# Patient Record
Sex: MALE | Race: WHITE | NOT HISPANIC OR LATINO | Employment: OTHER | URBAN - METROPOLITAN AREA
[De-identification: names, ages, dates, MRNs, and addresses within clinical notes are randomized per-mention and may not be internally consistent; named-entity substitution may affect disease eponyms.]

---

## 2019-01-10 ENCOUNTER — TRANSCRIBE ORDERS (OUTPATIENT)
Dept: ADMINISTRATIVE | Facility: HOSPITAL | Age: 74
End: 2019-01-10

## 2019-01-10 DIAGNOSIS — I20.0 UNSTABLE ANGINA (HCC): Primary | ICD-10-CM

## 2019-01-16 ENCOUNTER — APPOINTMENT (OUTPATIENT)
Dept: CARDIOLOGY | Facility: HOSPITAL | Age: 74
End: 2019-01-16
Attending: INTERNAL MEDICINE

## 2019-01-16 ENCOUNTER — APPOINTMENT (OUTPATIENT)
Dept: CARDIOLOGY | Facility: HOSPITAL | Age: 74
End: 2019-01-16

## 2019-01-16 ENCOUNTER — HOSPITAL ENCOUNTER (INPATIENT)
Facility: HOSPITAL | Age: 74
LOS: 6 days | Discharge: HOME | End: 2019-01-23
Attending: INTERNAL MEDICINE | Admitting: THORACIC SURGERY (CARDIOTHORACIC VASCULAR SURGERY)

## 2019-01-16 DIAGNOSIS — Z74.09 IMPAIRED FUNCTIONAL MOBILITY, BALANCE, GAIT, AND ENDURANCE: Primary | ICD-10-CM

## 2019-01-16 DIAGNOSIS — I20.0 UNSTABLE ANGINA (HCC): ICD-10-CM

## 2019-01-16 LAB
ANION GAP SERPL CALCULATED.3IONS-SCNC: 5 MMOL/L (ref 3–11)
BUN BLD-MCNC: 16 MG/DL (ref 9–23)
BUN/CREAT SERPL: 17.2 (ref 7–25)
CALCIUM SPEC-SCNC: 9.5 MG/DL (ref 8.7–10.4)
CHLORIDE SERPL-SCNC: 106 MMOL/L (ref 99–109)
CO2 SERPL-SCNC: 28 MMOL/L (ref 20–31)
CREAT BLD-MCNC: 0.93 MG/DL (ref 0.6–1.3)
DEPRECATED RDW RBC AUTO: 40.9 FL (ref 37–54)
ERYTHROCYTE [DISTWIDTH] IN BLOOD BY AUTOMATED COUNT: 12.6 % (ref 11.3–14.5)
GFR SERPL CREATININE-BSD FRML MDRD: 79 ML/MIN/1.73
GLUCOSE BLD-MCNC: 94 MG/DL (ref 70–100)
HCT VFR BLD AUTO: 42.2 % (ref 38.9–50.9)
HGB BLD-MCNC: 14.6 G/DL (ref 13.1–17.5)
MCH RBC QN AUTO: 30.9 PG (ref 27–31)
MCHC RBC AUTO-ENTMCNC: 34.6 G/DL (ref 32–36)
MCV RBC AUTO: 89.2 FL (ref 80–99)
PLATELET # BLD AUTO: 175 10*3/MM3 (ref 150–450)
PMV BLD AUTO: 10.4 FL (ref 6–12)
POTASSIUM BLD-SCNC: 4 MMOL/L (ref 3.5–5.5)
RBC # BLD AUTO: 4.73 10*6/MM3 (ref 4.2–5.76)
SODIUM BLD-SCNC: 139 MMOL/L (ref 132–146)
WBC NRBC COR # BLD: 5.98 10*3/MM3 (ref 3.5–10.8)

## 2019-01-16 PROCEDURE — A9270 NON-COVERED ITEM OR SERVICE: HCPCS | Performed by: INTERNAL MEDICINE

## 2019-01-16 PROCEDURE — C1894 INTRO/SHEATH, NON-LASER: HCPCS | Performed by: INTERNAL MEDICINE

## 2019-01-16 PROCEDURE — 86901 BLOOD TYPING SEROLOGIC RH(D): CPT

## 2019-01-16 PROCEDURE — 25010000002 MIDAZOLAM PER 1 MG: Performed by: INTERNAL MEDICINE

## 2019-01-16 PROCEDURE — B2111ZZ FLUOROSCOPY OF MULTIPLE CORONARY ARTERIES USING LOW OSMOLAR CONTRAST: ICD-10-PCS | Performed by: INTERNAL MEDICINE

## 2019-01-16 PROCEDURE — 25010000002 HEPARIN (PORCINE) PER 1000 UNITS: Performed by: INTERNAL MEDICINE

## 2019-01-16 PROCEDURE — 80048 BASIC METABOLIC PNL TOTAL CA: CPT | Performed by: INTERNAL MEDICINE

## 2019-01-16 PROCEDURE — 93880 EXTRACRANIAL BILAT STUDY: CPT

## 2019-01-16 PROCEDURE — 86900 BLOOD TYPING SEROLOGIC ABO: CPT

## 2019-01-16 PROCEDURE — 63710000001 METOPROLOL TARTRATE 25 MG TABLET: Performed by: INTERNAL MEDICINE

## 2019-01-16 PROCEDURE — 36415 COLL VENOUS BLD VENIPUNCTURE: CPT

## 2019-01-16 PROCEDURE — 4A023N7 MEASUREMENT OF CARDIAC SAMPLING AND PRESSURE, LEFT HEART, PERCUTANEOUS APPROACH: ICD-10-PCS | Performed by: INTERNAL MEDICINE

## 2019-01-16 PROCEDURE — 93458 L HRT ARTERY/VENTRICLE ANGIO: CPT | Performed by: INTERNAL MEDICINE

## 2019-01-16 PROCEDURE — 0 IOPAMIDOL PER 1 ML: Performed by: INTERNAL MEDICINE

## 2019-01-16 PROCEDURE — B2151ZZ FLUOROSCOPY OF LEFT HEART USING LOW OSMOLAR CONTRAST: ICD-10-PCS | Performed by: INTERNAL MEDICINE

## 2019-01-16 PROCEDURE — C1769 GUIDE WIRE: HCPCS | Performed by: INTERNAL MEDICINE

## 2019-01-16 PROCEDURE — 93306 TTE W/DOPPLER COMPLETE: CPT

## 2019-01-16 PROCEDURE — 85027 COMPLETE CBC AUTOMATED: CPT | Performed by: INTERNAL MEDICINE

## 2019-01-16 PROCEDURE — 25010000002 FENTANYL CITRATE (PF) 100 MCG/2ML SOLUTION: Performed by: INTERNAL MEDICINE

## 2019-01-16 RX ORDER — ASPIRIN 81 MG/1
81 TABLET ORAL DAILY
Status: DISCONTINUED | OUTPATIENT
Start: 2019-01-17 | End: 2019-01-17 | Stop reason: SDUPTHER

## 2019-01-16 RX ORDER — MAGNESIUM GLYCINATE 100 MG
1 TABLET ORAL 2 TIMES DAILY
COMMUNITY

## 2019-01-16 RX ORDER — MULTIVIT WITH MINERALS/LUTEIN
1000 TABLET ORAL 2 TIMES DAILY
COMMUNITY

## 2019-01-16 RX ORDER — MIDAZOLAM HYDROCHLORIDE 1 MG/ML
INJECTION INTRAMUSCULAR; INTRAVENOUS AS NEEDED
Status: DISCONTINUED | OUTPATIENT
Start: 2019-01-16 | End: 2019-01-16 | Stop reason: HOSPADM

## 2019-01-16 RX ORDER — ASPIRIN 81 MG/1
81 TABLET ORAL DAILY
COMMUNITY
End: 2019-01-23 | Stop reason: HOSPADM

## 2019-01-16 RX ORDER — LYSINE HCL 500 MG
1000 TABLET ORAL DAILY
COMMUNITY

## 2019-01-16 RX ORDER — ASPIRIN 325 MG
325 TABLET, DELAYED RELEASE (ENTERIC COATED) ORAL DAILY
Status: DISCONTINUED | OUTPATIENT
Start: 2019-01-16 | End: 2019-01-16

## 2019-01-16 RX ORDER — NITROGLYCERIN 0.4 MG/1
0.4 TABLET SUBLINGUAL
COMMUNITY

## 2019-01-16 RX ORDER — FENTANYL CITRATE 50 UG/ML
INJECTION, SOLUTION INTRAMUSCULAR; INTRAVENOUS AS NEEDED
Status: DISCONTINUED | OUTPATIENT
Start: 2019-01-16 | End: 2019-01-16 | Stop reason: HOSPADM

## 2019-01-16 RX ORDER — NIACIN 500 MG
500 TABLET ORAL NIGHTLY
COMMUNITY

## 2019-01-16 RX ORDER — LIDOCAINE HYDROCHLORIDE 10 MG/ML
INJECTION, SOLUTION EPIDURAL; INFILTRATION; INTRACAUDAL; PERINEURAL AS NEEDED
Status: DISCONTINUED | OUTPATIENT
Start: 2019-01-16 | End: 2019-01-16 | Stop reason: HOSPADM

## 2019-01-16 RX ORDER — ATORVASTATIN CALCIUM 40 MG/1
40 TABLET, FILM COATED ORAL NIGHTLY
Status: DISCONTINUED | OUTPATIENT
Start: 2019-01-16 | End: 2019-01-23 | Stop reason: HOSPADM

## 2019-01-16 RX ADMIN — METOPROLOL TARTRATE 25 MG: 25 TABLET ORAL at 22:01

## 2019-01-16 RX ADMIN — ASPIRIN 325 MG: 325 TABLET, DELAYED RELEASE ORAL at 12:19

## 2019-01-16 NOTE — H&P
Pre-Cardiac Catheterization Report  Cardiovascular Laboratory  [facility]      Patient:  Yosef Hardy  :  1945  PCP:  Mario Bashir MD  PHONE:  [phone]    DATE: 2019    BRIEF HPI:  Yosef Hardy is a 73 y.o. male with hypertension and a family history of premature coronary artery disease.  He is complaining of chest discomfort which he describes as a tightness that radiates to his neck and jaw.  He states it has been increasing over the last several months.  Associated symptoms include shortness of breath.  He states that his symptoms are exertion related and are relieved with rest.  He says his symptoms also increase after eating a large meal.  He recently underwent a stress echo and pliable that was abnormal.  He now presents for left heart catheterization with possible intervention.    Cardiac Risk Factors:  advanced age (older than 55 for men, 65 for women), family history of premature cardiovascular disease, hypertension, male gender    Anginal class in last 2 weeks:  CCS class III    CHF Class in last 2 weeks:  NYHA Class II    Cardiogenic shock:  no    Cardiac arrest <24 hours:  no    Stress test within last 6 months:   yes   Details:    Previous cardiac catheterization:  no  Details:     Previous CABG:  no  Details:      Allergies:     IV contrast allergy:  no  Allergies not on file    MEDICATIONS:  Prior to Admission medications    Not on File       Past medical & surgical history, social and family history reviewed in the electronic medical record.    ROS:  Cardiovascular ROS: positive for - chest pain and dyspnea on exertion    Physical Exam:    Vitals: There were no vitals filed for this visit. There were no vitals filed for this visit.    General Appearance:    Alert, cooperative, in no acute distress   Head:    Normocephalic, without obvious abnormality, atraumatic   Eyes:            Lids and lashes normal, conjunctivae and sclerae normal, no   icterus, no pallor,  corneas clear, PERRLA   Ears:    Ears appear intact with no abnormalities noted   Throat:      Neck:   No adenopathy, supple, trachea midline, no thyromegaly, no JVD   Back:     No kyphosis present, no scoliosis present, range of motion normal   Lungs:     Clear to auscultation,respirations regular, even and                  unlabored    Heart:    Regular rhythm and normal rate, normal S1 and S2, no            murmur, no gallop, no rub, no click   Chest Wall:    No abnormalities observed   Abdomen:     Normal bowel sounds, no masses, no organomegaly, soft        non-tender, non-distended, no guarding, no rebound                tenderness   Rectal:     Deferred   Extremities:   Moves all extremities well, no edema, no cyanosis, no             redness   Pulses:   Pulses palpable and equal bilaterally   Skin:   No bleeding, bruising or rash   Lymph nodes:      Neurologic:   Cranial nerves 2 - 12 grossly intact, sensation intact     Barbaeu Test:  Left: Normal  (oxymetric Allens) Right: Not Assessed             No results found for: CHLPL, TRIG, HDL, LDLDIRECT, AST, ALT        Impression      · Unstable angina/abnormal stress echo    Plan     · Procedure to perform: [facility]  · Planned access:  Left radial artery      OSCAR Amador  01/16/19  11:38 AM

## 2019-01-17 ENCOUNTER — APPOINTMENT (OUTPATIENT)
Dept: GENERAL RADIOLOGY | Facility: HOSPITAL | Age: 74
End: 2019-01-17

## 2019-01-17 ENCOUNTER — ANESTHESIA (OUTPATIENT)
Dept: PERIOP | Facility: HOSPITAL | Age: 74
End: 2019-01-17

## 2019-01-17 ENCOUNTER — ANESTHESIA EVENT (OUTPATIENT)
Dept: PERIOP | Facility: HOSPITAL | Age: 74
End: 2019-01-17

## 2019-01-17 PROBLEM — E78.5 HYPERLIPIDEMIA: Status: ACTIVE | Noted: 2019-01-17

## 2019-01-17 PROBLEM — Z95.1 S/P CABG X 4: Status: ACTIVE | Noted: 2019-01-17

## 2019-01-17 PROBLEM — I10 HYPERTENSION: Status: ACTIVE | Noted: 2019-01-17

## 2019-01-17 PROBLEM — E07.9 DISEASE OF THYROID GLAND: Status: ACTIVE | Noted: 2019-01-17

## 2019-01-17 PROBLEM — I25.10 CORONARY ARTERY DISEASE: Status: ACTIVE | Noted: 2019-01-17

## 2019-01-17 LAB
ABO GROUP BLD: NORMAL
ABO GROUP BLD: NORMAL
ACT BLD: 114 SECONDS (ref 82–152)
ACT BLD: 114 SECONDS (ref 82–152)
ACT BLD: 125 SECONDS (ref 82–152)
ACT BLD: 125 SECONDS (ref 82–152)
ACT BLD: 455 SECONDS (ref 82–152)
ACT BLD: 488 SECONDS (ref 82–152)
ACT BLD: 516 SECONDS (ref 82–152)
ACT BLD: 527 SECONDS (ref 82–152)
ACT BLD: 549 SECONDS (ref 82–152)
ALBUMIN SERPL-MCNC: 3.54 G/DL (ref 3.2–4.8)
ALBUMIN SERPL-MCNC: 3.62 G/DL (ref 3.2–4.8)
ANION GAP SERPL CALCULATED.3IONS-SCNC: 6 MMOL/L (ref 3–11)
ANION GAP SERPL CALCULATED.3IONS-SCNC: 8 MMOL/L (ref 3–11)
ANION GAP SERPL CALCULATED.3IONS-SCNC: 9 MMOL/L (ref 3–11)
APTT PPP: 36.3 SECONDS (ref 24–37)
ARTERIAL PATENCY WRIST A: ABNORMAL
ARTERIAL PATENCY WRIST A: ABNORMAL
ARTICHOKE IGE QN: 207 MG/DL (ref 0–130)
ATMOSPHERIC PRESS: ABNORMAL MMHG
ATMOSPHERIC PRESS: ABNORMAL MMHG
BASE EXCESS BLDA CALC-SCNC: -2 MMOL/L (ref -5–5)
BASE EXCESS BLDA CALC-SCNC: 0.1 MMOL/L (ref 0–2)
BASE EXCESS BLDA CALC-SCNC: 0.5 MMOL/L (ref 0–2)
BASE EXCESS BLDA CALC-SCNC: 1 MMOL/L (ref -5–5)
BASE EXCESS BLDA CALC-SCNC: 3 MMOL/L (ref -5–5)
BASE EXCESS BLDA CALC-SCNC: 4 MMOL/L (ref -5–5)
BASE EXCESS BLDA CALC-SCNC: 6 MMOL/L (ref -5–5)
BASE EXCESS BLDA CALC-SCNC: 6 MMOL/L (ref -5–5)
BASE EXCESS BLDA CALC-SCNC: 7 MMOL/L (ref -5–5)
BDY SITE: ABNORMAL
BDY SITE: ABNORMAL
BH CV ECHO MEAS - AO MAX PG (FULL): 5.4 MMHG
BH CV ECHO MEAS - AO MAX PG: 7.7 MMHG
BH CV ECHO MEAS - AO MEAN PG (FULL): 3 MMHG
BH CV ECHO MEAS - AO MEAN PG: 4.3 MMHG
BH CV ECHO MEAS - AO ROOT AREA: 8.3 CM^2
BH CV ECHO MEAS - AO ROOT DIAM: 3.3 CM
BH CV ECHO MEAS - AO V2 MAX: 139 CM/SEC
BH CV ECHO MEAS - AO V2 MEAN: 95.6 CM/SEC
BH CV ECHO MEAS - AO V2 VTI: 33.6 CM
BH CV ECHO MEAS - ASC AORTA: 3.2 CM
BH CV ECHO MEAS - AVA(I,A): 2.9 CM^2
BH CV ECHO MEAS - AVA(I,D): 2.9 CM^2
BH CV ECHO MEAS - AVA(V,A): 2.3 CM^2
BH CV ECHO MEAS - AVA(V,D): 2.3 CM^2
BH CV ECHO MEAS - EDV(CUBED): 109.3 ML
BH CV ECHO MEAS - EDV(TEICH): 106.6 ML
BH CV ECHO MEAS - EF(CUBED): 83 %
BH CV ECHO MEAS - EF(TEICH): 75.9 %
BH CV ECHO MEAS - ESV(CUBED): 18.5 ML
BH CV ECHO MEAS - ESV(TEICH): 25.7 ML
BH CV ECHO MEAS - FS: 44.7 %
BH CV ECHO MEAS - IVS/LVPW: 0.93
BH CV ECHO MEAS - IVSD: 1 CM
BH CV ECHO MEAS - LA DIMENSION: 3.6 CM
BH CV ECHO MEAS - LA/AO: 1.1
BH CV ECHO MEAS - LAT PEAK E' VEL: 7.9 CM/SEC
BH CV ECHO MEAS - LATERAL E/E' RATIO: 9
BH CV ECHO MEAS - LV MASS(C)D: 183 GRAMS
BH CV ECHO MEAS - LV MAX PG: 2.4 MMHG
BH CV ECHO MEAS - LV MEAN PG: 1.3 MMHG
BH CV ECHO MEAS - LV V1 MAX: 77 CM/SEC
BH CV ECHO MEAS - LV V1 MEAN: 51.5 CM/SEC
BH CV ECHO MEAS - LV V1 VTI: 23.7 CM
BH CV ECHO MEAS - LVIDD: 4.8 CM
BH CV ECHO MEAS - LVIDS: 2.6 CM
BH CV ECHO MEAS - LVOT AREA (M): 4.2 CM^2
BH CV ECHO MEAS - LVOT AREA: 4.1 CM^2
BH CV ECHO MEAS - LVOT DIAM: 2.3 CM
BH CV ECHO MEAS - LVPWD: 1.1 CM
BH CV ECHO MEAS - MED PEAK E' VEL: 6.4 CM/SEC
BH CV ECHO MEAS - MEDIAL E/E' RATIO: 11.1
BH CV ECHO MEAS - MV A MAX VEL: 92.3 CM/SEC
BH CV ECHO MEAS - MV DEC TIME: 0.26 SEC
BH CV ECHO MEAS - MV E MAX VEL: 72.6 CM/SEC
BH CV ECHO MEAS - MV E/A: 0.79
BH CV ECHO MEAS - PA ACC SLOPE: 456.9 CM/SEC^2
BH CV ECHO MEAS - PA ACC TIME: 0.17 SEC
BH CV ECHO MEAS - PA MAX PG: 4.7 MMHG
BH CV ECHO MEAS - PA PR(ACCEL): 0.57 MMHG
BH CV ECHO MEAS - PA V2 MAX: 108 CM/SEC
BH CV ECHO MEAS - RAP SYSTOLE: 8 MMHG
BH CV ECHO MEAS - RVDD: 1.8 CM
BH CV ECHO MEAS - RVSP: 30 MMHG
BH CV ECHO MEAS - SV(AO): 280.5 ML
BH CV ECHO MEAS - SV(CUBED): 90.8 ML
BH CV ECHO MEAS - SV(LVOT): 97.8 ML
BH CV ECHO MEAS - SV(TEICH): 80.9 ML
BH CV ECHO MEAS - TAPSE (>1.6): 2.5 CM2
BH CV ECHO MEAS - TR MAX PG: 22 MMHG
BH CV ECHO MEAS - TR MAX VEL: 230.2 CM/SEC
BH CV ECHO MEASUREMENTS AVERAGE E/E' RATIO: 10.15
BH CV XLRA MEAS LEFT CCA RATIO VEL: 88.7 CM/SEC
BH CV XLRA MEAS LEFT DIST CCA EDV: 18 CM/SEC
BH CV XLRA MEAS LEFT DIST CCA PSV: 89.8 CM/SEC
BH CV XLRA MEAS LEFT DIST ICA EDV: 31.4 CM/SEC
BH CV XLRA MEAS LEFT DIST ICA PSV: 121.2 CM/SEC
BH CV XLRA MEAS LEFT ICA RATIO VEL: 106 CM/SEC
BH CV XLRA MEAS LEFT ICA/CCA RATIO: 1.2
BH CV XLRA MEAS LEFT MID CCA EDV: 24.7 CM/SEC
BH CV XLRA MEAS LEFT MID CCA PSV: 128 CM/SEC
BH CV XLRA MEAS LEFT MID ICA EDV: 31.4 CM/SEC
BH CV XLRA MEAS LEFT MID ICA PSV: 106.6 CM/SEC
BH CV XLRA MEAS LEFT PROX CCA EDV: 19.1 CM/SEC
BH CV XLRA MEAS LEFT PROX CCA PSV: 121.2 CM/SEC
BH CV XLRA MEAS LEFT PROX ECA EDV: 9 CM/SEC
BH CV XLRA MEAS LEFT PROX ECA PSV: 117 CM/SEC
BH CV XLRA MEAS LEFT PROX ICA EDV: 24.7 CM/SEC
BH CV XLRA MEAS LEFT PROX ICA PSV: 94.3 CM/SEC
BH CV XLRA MEAS LEFT PROX SCLA PSV: 168.4 CM/SEC
BH CV XLRA MEAS LEFT VERTEBRAL A EDV: 13 CM/SEC
BH CV XLRA MEAS LEFT VERTEBRAL A PSV: 50 CM/SEC
BH CV XLRA MEAS RIGHT BULB EDV: 29.5 CM/SEC
BH CV XLRA MEAS RIGHT BULB PSV: 178.2 CM/SEC
BH CV XLRA MEAS RIGHT CCA RATIO VEL: 83.1 CM/SEC
BH CV XLRA MEAS RIGHT DIST CCA EDV: 20.2 CM/SEC
BH CV XLRA MEAS RIGHT DIST CCA PSV: 84.2 CM/SEC
BH CV XLRA MEAS RIGHT DIST ICA EDV: 33.7 CM/SEC
BH CV XLRA MEAS RIGHT DIST ICA PSV: 119.3 CM/SEC
BH CV XLRA MEAS RIGHT ICA RATIO VEL: 222 CM/SEC
BH CV XLRA MEAS RIGHT ICA/CCA RATIO: 2.7
BH CV XLRA MEAS RIGHT MID CCA EDV: 24.7 CM/SEC
BH CV XLRA MEAS RIGHT MID CCA PSV: 117.9 CM/SEC
BH CV XLRA MEAS RIGHT MID ICA EDV: 30.9 CM/SEC
BH CV XLRA MEAS RIGHT MID ICA PSV: 223.1 CM/SEC
BH CV XLRA MEAS RIGHT PROX CCA EDV: 16.8 CM/SEC
BH CV XLRA MEAS RIGHT PROX CCA PSV: 112.2 CM/SEC
BH CV XLRA MEAS RIGHT PROX ECA EDV: 11 CM/SEC
BH CV XLRA MEAS RIGHT PROX ECA PSV: 169 CM/SEC
BH CV XLRA MEAS RIGHT PROX ICA EDV: 32.3 CM/SEC
BH CV XLRA MEAS RIGHT PROX ICA PSV: 167 CM/SEC
BH CV XLRA MEAS RIGHT PROX SCLA PSV: 180.7 CM/SEC
BH CV XLRA MEAS RIGHT VERTEBRAL A EDV: 16.6 CM/SEC
BH CV XLRA MEAS RIGHT VERTEBRAL A PSV: 63.7 CM/SEC
BLD GP AB SCN SERPL QL: NEGATIVE
BODY TEMPERATURE: 37 C
BODY TEMPERATURE: 37 C
BUN BLD-MCNC: 11 MG/DL (ref 9–23)
BUN BLD-MCNC: 15 MG/DL (ref 9–23)
BUN BLD-MCNC: 19 MG/DL (ref 9–23)
BUN/CREAT SERPL: 10.4 (ref 7–25)
BUN/CREAT SERPL: 11.2 (ref 7–25)
BUN/CREAT SERPL: 13.2 (ref 7–25)
CA-I BLDA-SCNC: 0.9 MMOL/L (ref 1.2–1.32)
CA-I BLDA-SCNC: 1 MMOL/L (ref 1.2–1.32)
CA-I BLDA-SCNC: 1.23 MMOL/L (ref 1.2–1.32)
CA-I BLDA-SCNC: 1.3 MMOL/L (ref 1.2–1.32)
CA-I BLDA-SCNC: 1.31 MMOL/L (ref 1.2–1.32)
CA-I BLDA-SCNC: 1.38 MMOL/L (ref 1.2–1.32)
CA-I BLDA-SCNC: 1.39 MMOL/L (ref 1.2–1.32)
CA-I SERPL ISE-MCNC: 1.37 MMOL/L (ref 1.12–1.32)
CALCIUM SPEC-SCNC: 7.9 MG/DL (ref 8.7–10.4)
CALCIUM SPEC-SCNC: 8.8 MG/DL (ref 8.7–10.4)
CALCIUM SPEC-SCNC: 9.6 MG/DL (ref 8.7–10.4)
CHLORIDE SERPL-SCNC: 107 MMOL/L (ref 99–109)
CHLORIDE SERPL-SCNC: 108 MMOL/L (ref 99–109)
CHLORIDE SERPL-SCNC: 112 MMOL/L (ref 99–109)
CHOLEST SERPL-MCNC: 243 MG/DL (ref 0–200)
CO2 BLDA-SCNC: 24 MMOL/L (ref 24–29)
CO2 BLDA-SCNC: 26.4 MMOL/L (ref 23–27)
CO2 BLDA-SCNC: 27 MMOL/L (ref 24–29)
CO2 BLDA-SCNC: 27.9 MMOL/L (ref 23–27)
CO2 BLDA-SCNC: 29 MMOL/L (ref 24–29)
CO2 BLDA-SCNC: 29 MMOL/L (ref 24–29)
CO2 BLDA-SCNC: 30 MMOL/L (ref 24–29)
CO2 BLDA-SCNC: 32 MMOL/L (ref 24–29)
CO2 BLDA-SCNC: 32 MMOL/L (ref 24–29)
CO2 SERPL-SCNC: 23 MMOL/L (ref 20–31)
CO2 SERPL-SCNC: 24 MMOL/L (ref 20–31)
CO2 SERPL-SCNC: 25 MMOL/L (ref 20–31)
COHGB MFR BLD: 0.9 % (ref 0–2)
COHGB MFR BLD: 1.4 % (ref 0–2)
CREAT BLD-MCNC: 1.06 MG/DL (ref 0.6–1.3)
CREAT BLD-MCNC: 1.14 MG/DL (ref 0.6–1.3)
CREAT BLD-MCNC: 1.69 MG/DL (ref 0.6–1.3)
DEPRECATED RDW RBC AUTO: 41.5 FL (ref 37–54)
DEPRECATED RDW RBC AUTO: 41.8 FL (ref 37–54)
DEPRECATED RDW RBC AUTO: 42.9 FL (ref 37–54)
ERYTHROCYTE [DISTWIDTH] IN BLOOD BY AUTOMATED COUNT: 12.8 % (ref 11.3–14.5)
ERYTHROCYTE [DISTWIDTH] IN BLOOD BY AUTOMATED COUNT: 12.9 % (ref 11.3–14.5)
ERYTHROCYTE [DISTWIDTH] IN BLOOD BY AUTOMATED COUNT: 13.2 % (ref 11.3–14.5)
GFR SERPL CREATININE-BSD FRML MDRD: 40 ML/MIN/1.73
GFR SERPL CREATININE-BSD FRML MDRD: 63 ML/MIN/1.73
GFR SERPL CREATININE-BSD FRML MDRD: 68 ML/MIN/1.73
GLUCOSE BLD-MCNC: 100 MG/DL (ref 70–100)
GLUCOSE BLD-MCNC: 123 MG/DL (ref 70–100)
GLUCOSE BLD-MCNC: 193 MG/DL (ref 70–100)
GLUCOSE BLDC GLUCOMTR-MCNC: 106 MG/DL (ref 70–130)
GLUCOSE BLDC GLUCOMTR-MCNC: 115 MG/DL (ref 70–130)
GLUCOSE BLDC GLUCOMTR-MCNC: 118 MG/DL (ref 70–130)
GLUCOSE BLDC GLUCOMTR-MCNC: 118 MG/DL (ref 70–130)
GLUCOSE BLDC GLUCOMTR-MCNC: 120 MG/DL (ref 70–130)
GLUCOSE BLDC GLUCOMTR-MCNC: 122 MG/DL (ref 70–130)
GLUCOSE BLDC GLUCOMTR-MCNC: 125 MG/DL (ref 70–130)
GLUCOSE BLDC GLUCOMTR-MCNC: 126 MG/DL (ref 70–130)
GLUCOSE BLDC GLUCOMTR-MCNC: 132 MG/DL (ref 70–130)
GLUCOSE BLDC GLUCOMTR-MCNC: 133 MG/DL (ref 70–130)
GLUCOSE BLDC GLUCOMTR-MCNC: 154 MG/DL (ref 70–130)
GLUCOSE BLDC GLUCOMTR-MCNC: 171 MG/DL (ref 70–130)
GLUCOSE BLDC GLUCOMTR-MCNC: 173 MG/DL (ref 70–130)
GLUCOSE BLDC GLUCOMTR-MCNC: 178 MG/DL (ref 70–130)
GLUCOSE BLDC GLUCOMTR-MCNC: 92 MG/DL (ref 70–130)
HCO3 BLDA-SCNC: 22.7 MMOL/L (ref 22–26)
HCO3 BLDA-SCNC: 25.2 MMOL/L (ref 20–26)
HCO3 BLDA-SCNC: 26.1 MMOL/L (ref 22–26)
HCO3 BLDA-SCNC: 26.4 MMOL/L (ref 20–26)
HCO3 BLDA-SCNC: 27.5 MMOL/L (ref 22–26)
HCO3 BLDA-SCNC: 27.7 MMOL/L (ref 22–26)
HCO3 BLDA-SCNC: 29.2 MMOL/L (ref 22–26)
HCO3 BLDA-SCNC: 30.7 MMOL/L (ref 22–26)
HCO3 BLDA-SCNC: 30.9 MMOL/L (ref 22–26)
HCT VFR BLD AUTO: 23.4 % (ref 38.9–50.9)
HCT VFR BLD AUTO: 27.2 % (ref 38.9–50.9)
HCT VFR BLD AUTO: 29.4 % (ref 38.9–50.9)
HCT VFR BLD CALC: 28.9 %
HCT VFR BLD CALC: 29.6 %
HCT VFR BLDA CALC: 26 % (ref 38–51)
HCT VFR BLDA CALC: 27 % (ref 38–51)
HCT VFR BLDA CALC: 38 % (ref 38–51)
HCT VFR BLDA CALC: 43 % (ref 38–51)
HDLC SERPL-MCNC: 36 MG/DL (ref 40–60)
HGB BLD-MCNC: 10.1 G/DL (ref 13.1–17.5)
HGB BLD-MCNC: 8 G/DL (ref 13.1–17.5)
HGB BLD-MCNC: 9.4 G/DL (ref 13.1–17.5)
HGB BLDA-MCNC: 12.9 G/DL (ref 12–17)
HGB BLDA-MCNC: 14.6 G/DL (ref 12–17)
HGB BLDA-MCNC: 8.8 G/DL (ref 12–17)
HGB BLDA-MCNC: 9.2 G/DL (ref 12–17)
HGB BLDA-MCNC: 9.4 G/DL (ref 13.5–17.5)
HGB BLDA-MCNC: 9.7 G/DL (ref 13.5–17.5)
HOROWITZ INDEX BLD+IHG-RTO: 100 %
HOROWITZ INDEX BLD+IHG-RTO: 40 %
INR PPP: 1.05 (ref 0.85–1.16)
INR PPP: 1.27 (ref 0.85–1.16)
MAGNESIUM SERPL-MCNC: 2.8 MG/DL (ref 1.3–2.7)
MAGNESIUM SERPL-MCNC: 3.7 MG/DL (ref 1.3–2.7)
MAXIMAL PREDICTED HEART RATE: 146 BPM
MCH RBC QN AUTO: 30.7 PG (ref 27–31)
MCH RBC QN AUTO: 30.8 PG (ref 27–31)
MCH RBC QN AUTO: 31 PG (ref 27–31)
MCHC RBC AUTO-ENTMCNC: 34.2 G/DL (ref 32–36)
MCHC RBC AUTO-ENTMCNC: 34.4 G/DL (ref 32–36)
MCHC RBC AUTO-ENTMCNC: 34.6 G/DL (ref 32–36)
MCV RBC AUTO: 89.2 FL (ref 80–99)
MCV RBC AUTO: 89.7 FL (ref 80–99)
MCV RBC AUTO: 90.2 FL (ref 80–99)
METHGB BLD QL: 1.4 % (ref 0–1.5)
METHGB BLD QL: 1.5 % (ref 0–1.5)
MODALITY: ABNORMAL
MODALITY: ABNORMAL
NOTE: ABNORMAL
NOTE: ABNORMAL
OXYHGB MFR BLDV: 95.1 % (ref 94–99)
OXYHGB MFR BLDV: 97.6 % (ref 94–99)
PCO2 BLDA: 31.8 MM HG (ref 35–45)
PCO2 BLDA: 35 MM HG (ref 35–45)
PCO2 BLDA: 37.6 MM HG (ref 35–45)
PCO2 BLDA: 39.7 MM HG
PCO2 BLDA: 40.2 MM HG (ref 35–45)
PCO2 BLDA: 42.7 MM HG (ref 35–45)
PCO2 BLDA: 49.7 MM HG
PCO2 BLDA: 51 MM HG (ref 35–45)
PCO2 BLDA: 53.1 MM HG (ref 35–45)
PCO2 TEMP ADJ BLD: 39.7 MM HG (ref 35–48)
PCO2 TEMP ADJ BLD: 49.7 MM HG (ref 35–48)
PH BLDA: 7.32 PH UNITS (ref 7.35–7.6)
PH BLDA: 7.33 PH UNITS (ref 7.35–7.45)
PH BLDA: 7.39 PH UNITS (ref 7.35–7.6)
PH BLDA: 7.41 PH UNITS (ref 7.35–7.45)
PH BLDA: 7.42 PH UNITS (ref 7.35–7.6)
PH BLDA: 7.47 PH UNITS (ref 7.35–7.6)
PH BLDA: 7.47 PH UNITS (ref 7.35–7.6)
PH BLDA: 7.48 PH UNITS (ref 7.35–7.6)
PH BLDA: 7.52 PH UNITS (ref 7.35–7.6)
PH, TEMP CORRECTED: 7.33 PH UNITS
PH, TEMP CORRECTED: 7.41 PH UNITS
PHOSPHATE SERPL-MCNC: 2.7 MG/DL (ref 2.4–5.1)
PHOSPHATE SERPL-MCNC: 3.1 MG/DL (ref 2.4–5.1)
PLATELET # BLD AUTO: 123 10*3/MM3 (ref 150–450)
PLATELET # BLD AUTO: 151 10*3/MM3 (ref 150–450)
PLATELET # BLD AUTO: 228 10*3/MM3 (ref 150–450)
PMV BLD AUTO: 10.1 FL (ref 6–12)
PMV BLD AUTO: 10.2 FL (ref 6–12)
PMV BLD AUTO: 10.3 FL (ref 6–12)
PO2 BLDA: 291 MMHG (ref 80–105)
PO2 BLDA: 311 MM HG (ref 83–108)
PO2 BLDA: 340 MMHG (ref 80–105)
PO2 BLDA: 374 MMHG (ref 80–105)
PO2 BLDA: 434 MMHG (ref 80–105)
PO2 BLDA: 506 MMHG (ref 80–105)
PO2 BLDA: 521 MMHG (ref 80–105)
PO2 BLDA: 86 MMHG (ref 80–105)
PO2 BLDA: 99.4 MM HG (ref 83–108)
PO2 TEMP ADJ BLD: 311 MM HG (ref 83–108)
PO2 TEMP ADJ BLD: 99.4 MM HG (ref 83–108)
POTASSIUM BLD-SCNC: 3.6 MMOL/L (ref 3.5–5.5)
POTASSIUM BLD-SCNC: 3.9 MMOL/L (ref 3.5–5.5)
POTASSIUM BLD-SCNC: 4.2 MMOL/L (ref 3.5–5.5)
POTASSIUM BLDA-SCNC: 4 MMOL/L (ref 3.5–4.9)
POTASSIUM BLDA-SCNC: 4.4 MMOL/L (ref 3.5–4.9)
POTASSIUM BLDA-SCNC: 4.7 MMOL/L (ref 3.5–4.9)
POTASSIUM BLDA-SCNC: 5 MMOL/L (ref 3.5–4.9)
POTASSIUM BLDA-SCNC: 5.2 MMOL/L (ref 3.5–4.9)
POTASSIUM BLDA-SCNC: 5.2 MMOL/L (ref 3.5–4.9)
POTASSIUM BLDA-SCNC: 5.5 MMOL/L (ref 3.5–4.9)
PROTHROMBIN TIME: 13.2 SECONDS (ref 11.2–14.3)
PROTHROMBIN TIME: 15.3 SECONDS (ref 11.2–14.3)
RBC # BLD AUTO: 2.61 10*6/MM3 (ref 4.2–5.76)
RBC # BLD AUTO: 3.05 10*6/MM3 (ref 4.2–5.76)
RBC # BLD AUTO: 3.26 10*6/MM3 (ref 4.2–5.76)
RH BLD: NEGATIVE
RH BLD: NEGATIVE
SAO2 % BLDA: 100 % (ref 95–98)
SAO2 % BLDA: 97 % (ref 95–98)
SODIUM BLD-SCNC: 140 MMOL/L (ref 132–146)
SODIUM BLD-SCNC: 140 MMOL/L (ref 132–146)
SODIUM BLD-SCNC: 142 MMOL/L (ref 132–146)
SODIUM BLDA-SCNC: 134 MMOL/L (ref 138–146)
SODIUM BLDA-SCNC: 137 MMOL/L (ref 138–146)
SODIUM BLDA-SCNC: 138 MMOL/L (ref 138–146)
SODIUM BLDA-SCNC: 138 MMOL/L (ref 138–146)
SODIUM BLDA-SCNC: 139 MMOL/L (ref 138–146)
SODIUM BLDA-SCNC: 140 MMOL/L (ref 138–146)
SODIUM BLDA-SCNC: 143 MMOL/L (ref 138–146)
STRESS TARGET HR: 124 BPM
T&S EXPIRATION DATE: NORMAL
TRIGL SERPL-MCNC: 110 MG/DL (ref 0–150)
VENTILATOR MODE: ABNORMAL
VENTILATOR MODE: ABNORMAL
WBC NRBC COR # BLD: 16.66 10*3/MM3 (ref 3.5–10.8)
WBC NRBC COR # BLD: 8.38 10*3/MM3 (ref 3.5–10.8)
WBC NRBC COR # BLD: 9.37 10*3/MM3 (ref 3.5–10.8)

## 2019-01-17 PROCEDURE — 99223 1ST HOSP IP/OBS HIGH 75: CPT | Performed by: THORACIC SURGERY (CARDIOTHORACIC VASCULAR SURGERY)

## 2019-01-17 PROCEDURE — 71045 X-RAY EXAM CHEST 1 VIEW: CPT

## 2019-01-17 PROCEDURE — C1894 INTRO/SHEATH, NON-LASER: HCPCS | Performed by: THORACIC SURGERY (CARDIOTHORACIC VASCULAR SURGERY)

## 2019-01-17 PROCEDURE — C1751 CATH, INF, PER/CENT/MIDLINE: HCPCS | Performed by: ANESTHESIOLOGY

## 2019-01-17 PROCEDURE — 94002 VENT MGMT INPAT INIT DAY: CPT

## 2019-01-17 PROCEDURE — 25010000002 ALBUMIN HUMAN 5% PER 50 ML: Performed by: ANESTHESIOLOGY

## 2019-01-17 PROCEDURE — 25010000002 FENTANYL CITRATE (PF) 100 MCG/2ML SOLUTION: Performed by: THORACIC SURGERY (CARDIOTHORACIC VASCULAR SURGERY)

## 2019-01-17 PROCEDURE — 36430 TRANSFUSION BLD/BLD COMPNT: CPT

## 2019-01-17 PROCEDURE — 5A1221Z PERFORMANCE OF CARDIAC OUTPUT, CONTINUOUS: ICD-10-PCS | Performed by: THORACIC SURGERY (CARDIOTHORACIC VASCULAR SURGERY)

## 2019-01-17 PROCEDURE — 80061 LIPID PANEL: CPT | Performed by: INTERNAL MEDICINE

## 2019-01-17 PROCEDURE — 33533 CABG ARTERIAL SINGLE: CPT | Performed by: THORACIC SURGERY (CARDIOTHORACIC VASCULAR SURGERY)

## 2019-01-17 PROCEDURE — 25010000002 ALBUMIN HUMAN 5% PER 50 ML: Performed by: THORACIC SURGERY (CARDIOTHORACIC VASCULAR SURGERY)

## 2019-01-17 PROCEDURE — 80048 BASIC METABOLIC PNL TOTAL CA: CPT | Performed by: THORACIC SURGERY (CARDIOTHORACIC VASCULAR SURGERY)

## 2019-01-17 PROCEDURE — 86900 BLOOD TYPING SEROLOGIC ABO: CPT | Performed by: THORACIC SURGERY (CARDIOTHORACIC VASCULAR SURGERY)

## 2019-01-17 PROCEDURE — 25010000002 AMIODARONE IN DEXTROSE 5% 360-4.14 MG/200ML-% SOLUTION

## 2019-01-17 PROCEDURE — 25810000003 DEXTROSE 5 % WITH KCL 20 MEQ 20-5 MEQ/L-% SOLUTION: Performed by: PHYSICIAN ASSISTANT

## 2019-01-17 PROCEDURE — 93005 ELECTROCARDIOGRAM TRACING: CPT | Performed by: PHYSICIAN ASSISTANT

## 2019-01-17 PROCEDURE — 25010000002 CEFUROXIME: Performed by: PHYSICIAN ASSISTANT

## 2019-01-17 PROCEDURE — 85610 PROTHROMBIN TIME: CPT | Performed by: PHYSICIAN ASSISTANT

## 2019-01-17 PROCEDURE — 63710000001 FAMOTIDINE 20 MG TABLET: Performed by: ANESTHESIOLOGY

## 2019-01-17 PROCEDURE — 25010000002 CEFUROXIME: Performed by: THORACIC SURGERY (CARDIOTHORACIC VASCULAR SURGERY)

## 2019-01-17 PROCEDURE — 25010000002 ALBUMIN HUMAN 5% PER 50 ML: Performed by: PHYSICIAN ASSISTANT

## 2019-01-17 PROCEDURE — 86900 BLOOD TYPING SEROLOGIC ABO: CPT

## 2019-01-17 PROCEDURE — 85610 PROTHROMBIN TIME: CPT | Performed by: THORACIC SURGERY (CARDIOTHORACIC VASCULAR SURGERY)

## 2019-01-17 PROCEDURE — 25010000002 PHENYLEPHRINE 10 MG/ML SOLUTION: Performed by: PHYSICIAN ASSISTANT

## 2019-01-17 PROCEDURE — 63710000001 MUPIROCIN 2 % OINTMENT: Performed by: THORACIC SURGERY (CARDIOTHORACIC VASCULAR SURGERY)

## 2019-01-17 PROCEDURE — 06BP4ZZ EXCISION OF RIGHT SAPHENOUS VEIN, PERCUTANEOUS ENDOSCOPIC APPROACH: ICD-10-PCS | Performed by: THORACIC SURGERY (CARDIOTHORACIC VASCULAR SURGERY)

## 2019-01-17 PROCEDURE — 25010000002 FENTANYL CITRATE (PF) 100 MCG/2ML SOLUTION: Performed by: ANESTHESIOLOGY

## 2019-01-17 PROCEDURE — 86923 COMPATIBILITY TEST ELECTRIC: CPT

## 2019-01-17 PROCEDURE — 84295 ASSAY OF SERUM SODIUM: CPT

## 2019-01-17 PROCEDURE — 25010000003 DOPAMINE PER 40 MG: Performed by: ANESTHESIOLOGY

## 2019-01-17 PROCEDURE — 25010000002 HEPARIN (PORCINE) PER 1000 UNITS: Performed by: THORACIC SURGERY (CARDIOTHORACIC VASCULAR SURGERY)

## 2019-01-17 PROCEDURE — A9270 NON-COVERED ITEM OR SERVICE: HCPCS | Performed by: THORACIC SURGERY (CARDIOTHORACIC VASCULAR SURGERY)

## 2019-01-17 PROCEDURE — P9041 ALBUMIN (HUMAN),5%, 50ML: HCPCS | Performed by: THORACIC SURGERY (CARDIOTHORACIC VASCULAR SURGERY)

## 2019-01-17 PROCEDURE — 99233 SBSQ HOSP IP/OBS HIGH 50: CPT | Performed by: INTERNAL MEDICINE

## 2019-01-17 PROCEDURE — 82805 BLOOD GASES W/O2 SATURATION: CPT

## 2019-01-17 PROCEDURE — 86901 BLOOD TYPING SEROLOGIC RH(D): CPT | Performed by: THORACIC SURGERY (CARDIOTHORACIC VASCULAR SURGERY)

## 2019-01-17 PROCEDURE — 94799 UNLISTED PULMONARY SVC/PX: CPT

## 2019-01-17 PROCEDURE — 93005 ELECTROCARDIOGRAM TRACING: CPT | Performed by: INTERNAL MEDICINE

## 2019-01-17 PROCEDURE — 85730 THROMBOPLASTIN TIME PARTIAL: CPT | Performed by: PHYSICIAN ASSISTANT

## 2019-01-17 PROCEDURE — 25010000002 VANCOMYCIN PER 500 MG: Performed by: THORACIC SURGERY (CARDIOTHORACIC VASCULAR SURGERY)

## 2019-01-17 PROCEDURE — 84132 ASSAY OF SERUM POTASSIUM: CPT

## 2019-01-17 PROCEDURE — 63710000001 CHLORHEXIDINE 0.12 % SOLUTION: Performed by: THORACIC SURGERY (CARDIOTHORACIC VASCULAR SURGERY)

## 2019-01-17 PROCEDURE — A9270 NON-COVERED ITEM OR SERVICE: HCPCS | Performed by: ANESTHESIOLOGY

## 2019-01-17 PROCEDURE — 25010000002 PROTAMINE SULFATE PER 10 MG: Performed by: PHYSICIAN ASSISTANT

## 2019-01-17 PROCEDURE — 93010 ELECTROCARDIOGRAM REPORT: CPT | Performed by: INTERNAL MEDICINE

## 2019-01-17 PROCEDURE — 82330 ASSAY OF CALCIUM: CPT | Performed by: PHYSICIAN ASSISTANT

## 2019-01-17 PROCEDURE — 85027 COMPLETE CBC AUTOMATED: CPT | Performed by: THORACIC SURGERY (CARDIOTHORACIC VASCULAR SURGERY)

## 2019-01-17 PROCEDURE — 25010000002 PHENYLEPHRINE PER 1 ML: Performed by: ANESTHESIOLOGY

## 2019-01-17 PROCEDURE — 82947 ASSAY GLUCOSE BLOOD QUANT: CPT

## 2019-01-17 PROCEDURE — 86850 RBC ANTIBODY SCREEN: CPT | Performed by: THORACIC SURGERY (CARDIOTHORACIC VASCULAR SURGERY)

## 2019-01-17 PROCEDURE — 85014 HEMATOCRIT: CPT

## 2019-01-17 PROCEDURE — 25010000002 PROTAMINE SULFATE PER 10 MG: Performed by: ANESTHESIOLOGY

## 2019-01-17 PROCEDURE — 25010000002 PROTAMINE SULFATE PER 10 MG: Performed by: THORACIC SURGERY (CARDIOTHORACIC VASCULAR SURGERY)

## 2019-01-17 PROCEDURE — C1751 CATH, INF, PER/CENT/MIDLINE: HCPCS | Performed by: THORACIC SURGERY (CARDIOTHORACIC VASCULAR SURGERY)

## 2019-01-17 PROCEDURE — P9016 RBC LEUKOCYTES REDUCED: HCPCS

## 2019-01-17 PROCEDURE — 02100Z9 BYPASS CORONARY ARTERY, ONE ARTERY FROM LEFT INTERNAL MAMMARY, OPEN APPROACH: ICD-10-PCS | Performed by: THORACIC SURGERY (CARDIOTHORACIC VASCULAR SURGERY)

## 2019-01-17 PROCEDURE — A4648 IMPLANTABLE TISSUE MARKER: HCPCS | Performed by: THORACIC SURGERY (CARDIOTHORACIC VASCULAR SURGERY)

## 2019-01-17 PROCEDURE — P9041 ALBUMIN (HUMAN),5%, 50ML: HCPCS | Performed by: PHYSICIAN ASSISTANT

## 2019-01-17 PROCEDURE — 33508 ENDOSCOPIC VEIN HARVEST: CPT | Performed by: THORACIC SURGERY (CARDIOTHORACIC VASCULAR SURGERY)

## 2019-01-17 PROCEDURE — 36600 WITHDRAWAL OF ARTERIAL BLOOD: CPT

## 2019-01-17 PROCEDURE — 33519 CABG ARTERY-VEIN THREE: CPT | Performed by: THORACIC SURGERY (CARDIOTHORACIC VASCULAR SURGERY)

## 2019-01-17 PROCEDURE — 85027 COMPLETE CBC AUTOMATED: CPT | Performed by: PHYSICIAN ASSISTANT

## 2019-01-17 PROCEDURE — P9035 PLATELET PHERES LEUKOREDUCED: HCPCS

## 2019-01-17 PROCEDURE — 82330 ASSAY OF CALCIUM: CPT

## 2019-01-17 PROCEDURE — 82803 BLOOD GASES ANY COMBINATION: CPT

## 2019-01-17 PROCEDURE — 85347 COAGULATION TIME ACTIVATED: CPT

## 2019-01-17 PROCEDURE — 25010000002 ONDANSETRON PER 1 MG: Performed by: PHYSICIAN ASSISTANT

## 2019-01-17 PROCEDURE — 25010000002 PAPAVERINE PER 60 MG: Performed by: THORACIC SURGERY (CARDIOTHORACIC VASCULAR SURGERY)

## 2019-01-17 PROCEDURE — 83735 ASSAY OF MAGNESIUM: CPT | Performed by: PHYSICIAN ASSISTANT

## 2019-01-17 PROCEDURE — 25010000002 PROPOFOL 10 MG/ML EMULSION: Performed by: ANESTHESIOLOGY

## 2019-01-17 PROCEDURE — 80069 RENAL FUNCTION PANEL: CPT | Performed by: PHYSICIAN ASSISTANT

## 2019-01-17 PROCEDURE — P9041 ALBUMIN (HUMAN),5%, 50ML: HCPCS | Performed by: ANESTHESIOLOGY

## 2019-01-17 PROCEDURE — 021209W BYPASS CORONARY ARTERY, THREE ARTERIES FROM AORTA WITH AUTOLOGOUS VENOUS TISSUE, OPEN APPROACH: ICD-10-PCS | Performed by: THORACIC SURGERY (CARDIOTHORACIC VASCULAR SURGERY)

## 2019-01-17 DEVICE — DISK-SHAPED STYLE, SILICONE (1 PER STERILE PKG)
Type: IMPLANTABLE DEVICE | Site: HEART | Status: FUNCTIONAL
Brand: SCANLAN® RADIOMARK® GRAFT MARKERS

## 2019-01-17 RX ORDER — SENNA AND DOCUSATE SODIUM 50; 8.6 MG/1; MG/1
2 TABLET, FILM COATED ORAL 2 TIMES DAILY
Status: DISCONTINUED | OUTPATIENT
Start: 2019-01-17 | End: 2019-01-23 | Stop reason: HOSPADM

## 2019-01-17 RX ORDER — ANASTROZOLE 1 MG/1
0.5 TABLET ORAL 2 TIMES WEEKLY
COMMUNITY

## 2019-01-17 RX ORDER — FAMOTIDINE 20 MG/1
20 TABLET, FILM COATED ORAL ONCE
Status: DISCONTINUED | OUTPATIENT
Start: 2019-01-17 | End: 2019-01-17

## 2019-01-17 RX ORDER — PROTAMINE SULFATE 10 MG/ML
50 INJECTION, SOLUTION INTRAVENOUS ONCE
Status: COMPLETED | OUTPATIENT
Start: 2019-01-17 | End: 2019-01-17

## 2019-01-17 RX ORDER — SODIUM CHLORIDE 0.9 % (FLUSH) 0.9 %
3-10 SYRINGE (ML) INJECTION AS NEEDED
Status: DISCONTINUED | OUTPATIENT
Start: 2019-01-17 | End: 2019-01-17

## 2019-01-17 RX ORDER — SODIUM CHLORIDE, SODIUM LACTATE, POTASSIUM CHLORIDE, CALCIUM CHLORIDE 600; 310; 30; 20 MG/100ML; MG/100ML; MG/100ML; MG/100ML
9 INJECTION, SOLUTION INTRAVENOUS CONTINUOUS
Status: DISCONTINUED | OUTPATIENT
Start: 2019-01-17 | End: 2019-01-17

## 2019-01-17 RX ORDER — SODIUM CHLORIDE 9 MG/ML
30 INJECTION, SOLUTION INTRAVENOUS CONTINUOUS PRN
Status: DISCONTINUED | OUTPATIENT
Start: 2019-01-17 | End: 2019-01-17

## 2019-01-17 RX ORDER — AMINOCAPROIC ACID 250 MG/ML
INJECTION, SOLUTION INTRAVENOUS AS NEEDED
Status: DISCONTINUED | OUTPATIENT
Start: 2019-01-17 | End: 2019-01-17 | Stop reason: SURG

## 2019-01-17 RX ORDER — SUFENTANIL CITRATE 50 UG/ML
INJECTION EPIDURAL; INTRAVENOUS AS NEEDED
Status: DISCONTINUED | OUTPATIENT
Start: 2019-01-17 | End: 2019-01-17 | Stop reason: SURG

## 2019-01-17 RX ORDER — SODIUM CHLORIDE, SODIUM LACTATE, POTASSIUM CHLORIDE, CALCIUM CHLORIDE 600; 310; 30; 20 MG/100ML; MG/100ML; MG/100ML; MG/100ML
9 INJECTION, SOLUTION INTRAVENOUS CONTINUOUS
Status: CANCELLED | OUTPATIENT
Start: 2019-01-17

## 2019-01-17 RX ORDER — NITROGLYCERIN 20 MG/100ML
5-200 INJECTION INTRAVENOUS CONTINUOUS PRN
Status: DISCONTINUED | OUTPATIENT
Start: 2019-01-17 | End: 2019-01-18

## 2019-01-17 RX ORDER — SODIUM CHLORIDE 0.9 % (FLUSH) 0.9 %
3 SYRINGE (ML) INJECTION EVERY 12 HOURS SCHEDULED
Status: CANCELLED | OUTPATIENT
Start: 2019-01-17

## 2019-01-17 RX ORDER — POTASSIUM CHLORIDE 750 MG/1
40 CAPSULE, EXTENDED RELEASE ORAL AS NEEDED
Status: DISCONTINUED | OUTPATIENT
Start: 2019-01-17 | End: 2019-01-21

## 2019-01-17 RX ORDER — METOPROLOL TARTRATE 5 MG/5ML
2.5 INJECTION INTRAVENOUS EVERY 6 HOURS SCHEDULED
Status: ACTIVE | OUTPATIENT
Start: 2019-01-17 | End: 2019-01-18

## 2019-01-17 RX ORDER — ACETAMINOPHEN 325 MG/1
650 TABLET ORAL EVERY 4 HOURS PRN
Status: DISCONTINUED | OUTPATIENT
Start: 2019-01-17 | End: 2019-01-23 | Stop reason: HOSPADM

## 2019-01-17 RX ORDER — LIDOCAINE HYDROCHLORIDE 10 MG/ML
0.5 INJECTION, SOLUTION EPIDURAL; INFILTRATION; INTRACAUDAL; PERINEURAL ONCE
Status: COMPLETED | OUTPATIENT
Start: 2019-01-17 | End: 2019-01-17

## 2019-01-17 RX ORDER — ATORVASTATIN CALCIUM 40 MG/1
40 TABLET, FILM COATED ORAL NIGHTLY
Status: DISCONTINUED | OUTPATIENT
Start: 2019-01-17 | End: 2019-01-17 | Stop reason: SDUPTHER

## 2019-01-17 RX ORDER — HYDROCODONE BITARTRATE AND ACETAMINOPHEN 7.5; 325 MG/1; MG/1
1 TABLET ORAL EVERY 4 HOURS PRN
Status: DISCONTINUED | OUTPATIENT
Start: 2019-01-17 | End: 2019-01-23 | Stop reason: HOSPADM

## 2019-01-17 RX ORDER — SODIUM CHLORIDE 0.9 % (FLUSH) 0.9 %
30 SYRINGE (ML) INJECTION ONCE AS NEEDED
Status: DISCONTINUED | OUTPATIENT
Start: 2019-01-17 | End: 2019-01-17

## 2019-01-17 RX ORDER — FAMOTIDINE 20 MG/1
20 TABLET, FILM COATED ORAL ONCE
Status: CANCELLED | OUTPATIENT
Start: 2019-01-17 | End: 2019-01-17

## 2019-01-17 RX ORDER — PROTAMINE SULFATE 10 MG/ML
INJECTION, SOLUTION INTRAVENOUS
Status: DISPENSED
Start: 2019-01-17 | End: 2019-01-17

## 2019-01-17 RX ORDER — ALBUMIN, HUMAN INJ 5% 5 %
500 SOLUTION INTRAVENOUS AS NEEDED
Status: COMPLETED | OUTPATIENT
Start: 2019-01-17 | End: 2019-01-17

## 2019-01-17 RX ORDER — ALBUTEROL SULFATE 2.5 MG/3ML
2.5 SOLUTION RESPIRATORY (INHALATION) EVERY 4 HOURS PRN
Status: ACTIVE | OUTPATIENT
Start: 2019-01-17 | End: 2019-01-18

## 2019-01-17 RX ORDER — DOCUSATE SODIUM 100 MG/1
100 CAPSULE, LIQUID FILLED ORAL 2 TIMES DAILY PRN
Status: DISCONTINUED | OUTPATIENT
Start: 2019-01-17 | End: 2019-01-23 | Stop reason: HOSPADM

## 2019-01-17 RX ORDER — SODIUM CHLORIDE, SODIUM LACTATE, POTASSIUM CHLORIDE, CALCIUM CHLORIDE 600; 310; 30; 20 MG/100ML; MG/100ML; MG/100ML; MG/100ML
9 INJECTION, SOLUTION INTRAVENOUS ONCE
Status: COMPLETED | OUTPATIENT
Start: 2019-01-17 | End: 2019-01-17

## 2019-01-17 RX ORDER — FENTANYL CITRATE 50 UG/ML
25 INJECTION, SOLUTION INTRAMUSCULAR; INTRAVENOUS
Status: DISCONTINUED | OUTPATIENT
Start: 2019-01-17 | End: 2019-01-21

## 2019-01-17 RX ORDER — POTASSIUM CHLORIDE, DEXTROSE MONOHYDRATE 150; 5 MG/100ML; G/100ML
30 INJECTION, SOLUTION INTRAVENOUS CONTINUOUS
Status: DISCONTINUED | OUTPATIENT
Start: 2019-01-17 | End: 2019-01-21

## 2019-01-17 RX ORDER — PAPAVERINE HYDROCHLORIDE 30 MG/ML
INJECTION INTRAMUSCULAR; INTRAVENOUS AS NEEDED
Status: DISCONTINUED | OUTPATIENT
Start: 2019-01-17 | End: 2019-01-17 | Stop reason: HOSPADM

## 2019-01-17 RX ORDER — ONDANSETRON 2 MG/ML
4 INJECTION INTRAMUSCULAR; INTRAVENOUS ONCE
Status: DISCONTINUED | OUTPATIENT
Start: 2019-01-17 | End: 2019-01-21

## 2019-01-17 RX ORDER — POTASSIUM CHLORIDE 29.8 MG/ML
20 INJECTION INTRAVENOUS
Status: DISCONTINUED | OUTPATIENT
Start: 2019-01-17 | End: 2019-01-21

## 2019-01-17 RX ORDER — SODIUM CHLORIDE 0.9 % (FLUSH) 0.9 %
3 SYRINGE (ML) INJECTION EVERY 12 HOURS SCHEDULED
Status: DISCONTINUED | OUTPATIENT
Start: 2019-01-17 | End: 2019-01-17

## 2019-01-17 RX ORDER — ROCURONIUM BROMIDE 10 MG/ML
INJECTION, SOLUTION INTRAVENOUS AS NEEDED
Status: DISCONTINUED | OUTPATIENT
Start: 2019-01-17 | End: 2019-01-17 | Stop reason: SURG

## 2019-01-17 RX ORDER — LIDOCAINE HYDROCHLORIDE 10 MG/ML
0.5 INJECTION, SOLUTION EPIDURAL; INFILTRATION; INTRACAUDAL; PERINEURAL ONCE AS NEEDED
Status: CANCELLED | OUTPATIENT
Start: 2019-01-17

## 2019-01-17 RX ORDER — NOREPINEPHRINE BIT/0.9 % NACL 8 MG/250ML
.02-.3 INFUSION BOTTLE (ML) INTRAVENOUS CONTINUOUS PRN
Status: DISCONTINUED | OUTPATIENT
Start: 2019-01-17 | End: 2019-01-19

## 2019-01-17 RX ORDER — DOBUTAMINE HYDROCHLORIDE 100 MG/100ML
2-20 INJECTION INTRAVENOUS CONTINUOUS PRN
Status: DISCONTINUED | OUTPATIENT
Start: 2019-01-17 | End: 2019-01-18

## 2019-01-17 RX ORDER — FAMOTIDINE 10 MG/ML
20 INJECTION, SOLUTION INTRAVENOUS EVERY 12 HOURS SCHEDULED
Status: DISCONTINUED | OUTPATIENT
Start: 2019-01-17 | End: 2019-01-19

## 2019-01-17 RX ORDER — SODIUM CHLORIDE 0.9 % (FLUSH) 0.9 %
3-10 SYRINGE (ML) INJECTION AS NEEDED
Status: CANCELLED | OUTPATIENT
Start: 2019-01-17

## 2019-01-17 RX ORDER — NALOXONE HCL 0.4 MG/ML
0.4 VIAL (ML) INJECTION
Status: DISCONTINUED | OUTPATIENT
Start: 2019-01-17 | End: 2019-01-21

## 2019-01-17 RX ORDER — LIDOCAINE HYDROCHLORIDE 10 MG/ML
0.5 INJECTION, SOLUTION EPIDURAL; INFILTRATION; INTRACAUDAL; PERINEURAL ONCE AS NEEDED
Status: DISCONTINUED | OUTPATIENT
Start: 2019-01-17 | End: 2019-01-17 | Stop reason: HOSPADM

## 2019-01-17 RX ORDER — FENTANYL CITRATE 50 UG/ML
INJECTION, SOLUTION INTRAMUSCULAR; INTRAVENOUS AS NEEDED
Status: DISCONTINUED | OUTPATIENT
Start: 2019-01-17 | End: 2019-01-17 | Stop reason: SURG

## 2019-01-17 RX ORDER — PROTAMINE SULFATE 10 MG/ML
INJECTION, SOLUTION INTRAVENOUS AS NEEDED
Status: DISCONTINUED | OUTPATIENT
Start: 2019-01-17 | End: 2019-01-17 | Stop reason: SURG

## 2019-01-17 RX ORDER — BISACODYL 10 MG
10 SUPPOSITORY, RECTAL RECTAL DAILY PRN
Status: DISCONTINUED | OUTPATIENT
Start: 2019-01-18 | End: 2019-01-21

## 2019-01-17 RX ORDER — POTASSIUM CHLORIDE 1.5 G/1.77G
40 POWDER, FOR SOLUTION ORAL AS NEEDED
Status: DISCONTINUED | OUTPATIENT
Start: 2019-01-17 | End: 2019-01-21

## 2019-01-17 RX ORDER — DOPAMINE HYDROCHLORIDE 80 MG/100ML
INJECTION, SOLUTION INTRAVENOUS CONTINUOUS PRN
Status: DISCONTINUED | OUTPATIENT
Start: 2019-01-17 | End: 2019-01-17 | Stop reason: SURG

## 2019-01-17 RX ORDER — SODIUM CHLORIDE 9 MG/ML
INJECTION, SOLUTION INTRAVENOUS CONTINUOUS PRN
Status: DISCONTINUED | OUTPATIENT
Start: 2019-01-17 | End: 2019-01-17 | Stop reason: SURG

## 2019-01-17 RX ORDER — ALBUMIN, HUMAN INJ 5% 5 %
SOLUTION INTRAVENOUS
Status: DISPENSED
Start: 2019-01-17 | End: 2019-01-17

## 2019-01-17 RX ORDER — ASPIRIN 325 MG
325 TABLET, DELAYED RELEASE (ENTERIC COATED) ORAL DAILY
Status: DISCONTINUED | OUTPATIENT
Start: 2019-01-18 | End: 2019-01-23 | Stop reason: HOSPADM

## 2019-01-17 RX ORDER — CALCIUM CHLORIDE 100 MG/ML
INJECTION INTRAVENOUS; INTRAVENTRICULAR AS NEEDED
Status: DISCONTINUED | OUTPATIENT
Start: 2019-01-17 | End: 2019-01-17 | Stop reason: SURG

## 2019-01-17 RX ORDER — CHLORHEXIDINE GLUCONATE 0.12 MG/ML
15 RINSE ORAL ONCE
Status: COMPLETED | OUTPATIENT
Start: 2019-01-17 | End: 2019-01-17

## 2019-01-17 RX ORDER — BISACODYL 5 MG/1
10 TABLET, DELAYED RELEASE ORAL DAILY PRN
Status: DISCONTINUED | OUTPATIENT
Start: 2019-01-17 | End: 2019-01-23 | Stop reason: HOSPADM

## 2019-01-17 RX ORDER — CHLORHEXIDINE GLUCONATE 0.12 MG/ML
15 RINSE ORAL EVERY 12 HOURS SCHEDULED
Status: DISCONTINUED | OUTPATIENT
Start: 2019-01-17 | End: 2019-01-17

## 2019-01-17 RX ORDER — OXYCODONE HYDROCHLORIDE AND ACETAMINOPHEN 5; 325 MG/1; MG/1
2 TABLET ORAL EVERY 4 HOURS PRN
Status: DISCONTINUED | OUTPATIENT
Start: 2019-01-17 | End: 2019-01-23 | Stop reason: HOSPADM

## 2019-01-17 RX ORDER — MEPERIDINE HYDROCHLORIDE 25 MG/ML
25 INJECTION INTRAMUSCULAR; INTRAVENOUS; SUBCUTANEOUS EVERY 4 HOURS PRN
Status: DISPENSED | OUTPATIENT
Start: 2019-01-17 | End: 2019-01-17

## 2019-01-17 RX ORDER — FAMOTIDINE 10 MG/ML
20 INJECTION, SOLUTION INTRAVENOUS ONCE
Status: CANCELLED | OUTPATIENT
Start: 2019-01-17 | End: 2019-01-17

## 2019-01-17 RX ORDER — ETOMIDATE 2 MG/ML
INJECTION INTRAVENOUS AS NEEDED
Status: DISCONTINUED | OUTPATIENT
Start: 2019-01-17 | End: 2019-01-17 | Stop reason: SURG

## 2019-01-17 RX ORDER — LIDOCAINE HYDROCHLORIDE 20 MG/ML
INJECTION, SOLUTION INFILTRATION; PERINEURAL AS NEEDED
Status: DISCONTINUED | OUTPATIENT
Start: 2019-01-17 | End: 2019-01-17 | Stop reason: SURG

## 2019-01-17 RX ORDER — ASPIRIN 325 MG
325 TABLET ORAL ONCE
Status: COMPLETED | OUTPATIENT
Start: 2019-01-17 | End: 2019-01-17

## 2019-01-17 RX ORDER — ALBUMIN, HUMAN INJ 5% 5 %
SOLUTION INTRAVENOUS CONTINUOUS PRN
Status: DISCONTINUED | OUTPATIENT
Start: 2019-01-17 | End: 2019-01-17 | Stop reason: SURG

## 2019-01-17 RX ORDER — SODIUM CHLORIDE 9 MG/ML
INJECTION, SOLUTION INTRAVENOUS AS NEEDED
Status: DISCONTINUED | OUTPATIENT
Start: 2019-01-17 | End: 2019-01-17 | Stop reason: HOSPADM

## 2019-01-17 RX ORDER — FAMOTIDINE 20 MG/1
20 TABLET, FILM COATED ORAL ONCE
Status: COMPLETED | OUTPATIENT
Start: 2019-01-17 | End: 2019-01-17

## 2019-01-17 RX ORDER — ALBUMIN, HUMAN INJ 5% 5 %
500 SOLUTION INTRAVENOUS ONCE
Status: COMPLETED | OUTPATIENT
Start: 2019-01-17 | End: 2019-01-17

## 2019-01-17 RX ORDER — LEVOTHYROXINE AND LIOTHYRONINE 9.5; 2.25 UG/1; UG/1
45 TABLET ORAL DAILY
COMMUNITY

## 2019-01-17 RX ORDER — FAMOTIDINE 10 MG/ML
20 INJECTION, SOLUTION INTRAVENOUS ONCE
Status: DISCONTINUED | OUTPATIENT
Start: 2019-01-17 | End: 2019-01-17

## 2019-01-17 RX ORDER — MORPHINE SULFATE 2 MG/ML
2 INJECTION, SOLUTION INTRAMUSCULAR; INTRAVENOUS
Status: DISCONTINUED | OUTPATIENT
Start: 2019-01-17 | End: 2019-01-21

## 2019-01-17 RX ORDER — ONDANSETRON 2 MG/ML
4 INJECTION INTRAMUSCULAR; INTRAVENOUS EVERY 6 HOURS PRN
Status: DISCONTINUED | OUTPATIENT
Start: 2019-01-17 | End: 2019-01-23 | Stop reason: HOSPADM

## 2019-01-17 RX ORDER — PHENYLEPHRINE HCL IN 0.9% NACL 0.5 MG/5ML
.5-3 SYRINGE (ML) INTRAVENOUS CONTINUOUS PRN
Status: DISCONTINUED | OUTPATIENT
Start: 2019-01-17 | End: 2019-01-18

## 2019-01-17 RX ORDER — DOPAMINE HYDROCHLORIDE 160 MG/100ML
2-20 INJECTION, SOLUTION INTRAVENOUS CONTINUOUS PRN
Status: DISCONTINUED | OUTPATIENT
Start: 2019-01-17 | End: 2019-01-19

## 2019-01-17 RX ORDER — PROTAMINE SULFATE 10 MG/ML
INJECTION, SOLUTION INTRAVENOUS
Status: DISPENSED
Start: 2019-01-17 | End: 2019-01-18

## 2019-01-17 RX ORDER — PROPOFOL 10 MG/ML
VIAL (ML) INTRAVENOUS CONTINUOUS PRN
Status: DISCONTINUED | OUTPATIENT
Start: 2019-01-17 | End: 2019-01-17 | Stop reason: SURG

## 2019-01-17 RX ADMIN — PROTAMINE SULFATE 300 MG: 10 INJECTION, SOLUTION INTRAVENOUS at 10:12

## 2019-01-17 RX ADMIN — CALCIUM CHLORIDE 1 G: 100 INJECTION INTRAVENOUS; INTRAVENTRICULAR at 10:12

## 2019-01-17 RX ADMIN — ETOMIDATE 25.18 MG: 2 INJECTION, SOLUTION INTRAVENOUS at 07:09

## 2019-01-17 RX ADMIN — CEFUROXIME 1.5 G: 1.5 INJECTION, POWDER, FOR SOLUTION INTRAVENOUS at 07:31

## 2019-01-17 RX ADMIN — PHENYLEPHRINE HYDROCHLORIDE 100 MCG: 10 INJECTION INTRAVENOUS at 08:46

## 2019-01-17 RX ADMIN — SODIUM CHLORIDE: 9 INJECTION, SOLUTION INTRAVENOUS at 07:05

## 2019-01-17 RX ADMIN — ALBUMIN HUMAN: 0.05 INJECTION, SOLUTION INTRAVENOUS at 10:52

## 2019-01-17 RX ADMIN — SUFENTANIL CITRATE 50 MCG: 50 INJECTION, SOLUTION EPIDURAL; INTRAVENOUS at 07:16

## 2019-01-17 RX ADMIN — LIDOCAINE HYDROCHLORIDE 0.5 ML: 10 INJECTION, SOLUTION EPIDURAL; INFILTRATION; INTRACAUDAL; PERINEURAL at 06:22

## 2019-01-17 RX ADMIN — FAMOTIDINE 20 MG: 20 TABLET ORAL at 06:42

## 2019-01-17 RX ADMIN — FENTANYL CITRATE 100 MCG: 50 INJECTION, SOLUTION INTRAMUSCULAR; INTRAVENOUS at 07:07

## 2019-01-17 RX ADMIN — ALBUMIN (HUMAN) 500 ML: 12.5 SOLUTION INTRAVENOUS at 16:55

## 2019-01-17 RX ADMIN — AMINOCAPROIC ACID 10 G: 250 INJECTION, SOLUTION INTRAVENOUS at 10:12

## 2019-01-17 RX ADMIN — PHENYLEPHRINE HYDROCHLORIDE 100 MCG: 10 INJECTION INTRAVENOUS at 08:32

## 2019-01-17 RX ADMIN — PHENYLEPHRINE HYDROCHLORIDE 200 MCG: 10 INJECTION INTRAVENOUS at 07:41

## 2019-01-17 RX ADMIN — SUFENTANIL CITRATE 50 MCG: 50 INJECTION, SOLUTION EPIDURAL; INTRAVENOUS at 08:49

## 2019-01-17 RX ADMIN — PHENYLEPHRINE HYDROCHLORIDE 2 MCG/KG/MIN: 10 INJECTION INTRAVENOUS at 17:00

## 2019-01-17 RX ADMIN — PROTAMINE SULFATE 50 MG: 10 INJECTION, SOLUTION INTRAVENOUS at 12:45

## 2019-01-17 RX ADMIN — EPHEDRINE SULFATE 5 MG: 50 INJECTION INTRAMUSCULAR; INTRAVENOUS; SUBCUTANEOUS at 08:15

## 2019-01-17 RX ADMIN — CEFUROXIME 1.5 G: 1.5 INJECTION, POWDER, FOR SOLUTION INTRAVENOUS at 18:07

## 2019-01-17 RX ADMIN — ACETAMINOPHEN 650 MG: 325 TABLET ORAL at 21:08

## 2019-01-17 RX ADMIN — DOPAMINE HYDROCHLORIDE 5 MCG/KG/MIN: 80 INJECTION, SOLUTION INTRAVENOUS at 09:58

## 2019-01-17 RX ADMIN — SUFENTANIL CITRATE 25 MCG: 50 INJECTION, SOLUTION EPIDURAL; INTRAVENOUS at 07:56

## 2019-01-17 RX ADMIN — SODIUM CHLORIDE 2.4 UNITS/HR: 9 INJECTION, SOLUTION INTRAVENOUS at 16:13

## 2019-01-17 RX ADMIN — PROPOFOL 25 MCG/KG/MIN: 10 INJECTION, EMULSION INTRAVENOUS at 10:47

## 2019-01-17 RX ADMIN — AMIODARONE HYDROCHLORIDE 1 MG/MIN: 1.8 INJECTION, SOLUTION INTRAVENOUS at 16:54

## 2019-01-17 RX ADMIN — ALBUMIN (HUMAN) 500 ML: 12.5 SOLUTION INTRAVENOUS at 14:28

## 2019-01-17 RX ADMIN — SUFENTANIL CITRATE 25 MCG: 50 INJECTION, SOLUTION EPIDURAL; INTRAVENOUS at 08:04

## 2019-01-17 RX ADMIN — PROTAMINE SULFATE 50 MG: 10 INJECTION, SOLUTION INTRAVENOUS at 11:45

## 2019-01-17 RX ADMIN — FAMOTIDINE 20 MG: 10 INJECTION, SOLUTION INTRAVENOUS at 21:08

## 2019-01-17 RX ADMIN — LIDOCAINE HYDROCHLORIDE 50 MG: 20 INJECTION, SOLUTION INFILTRATION; PERINEURAL at 07:09

## 2019-01-17 RX ADMIN — SODIUM CHLORIDE, POTASSIUM CHLORIDE, SODIUM LACTATE AND CALCIUM CHLORIDE 9 ML/HR: 600; 310; 30; 20 INJECTION, SOLUTION INTRAVENOUS at 06:22

## 2019-01-17 RX ADMIN — FENTANYL CITRATE 25 MCG: 50 INJECTION, SOLUTION INTRAMUSCULAR; INTRAVENOUS at 21:09

## 2019-01-17 RX ADMIN — POTASSIUM CHLORIDE AND DEXTROSE MONOHYDRATE 30 ML/HR: 150; 5 INJECTION, SOLUTION INTRAVENOUS at 11:30

## 2019-01-17 RX ADMIN — ASPIRIN 325 MG ORAL TABLET 325 MG: 325 PILL ORAL at 12:45

## 2019-01-17 RX ADMIN — CHLORHEXIDINE GLUCONATE 15 ML: 1.2 RINSE ORAL at 06:40

## 2019-01-17 RX ADMIN — PHENYLEPHRINE HYDROCHLORIDE 100 MCG: 10 INJECTION INTRAVENOUS at 08:45

## 2019-01-17 RX ADMIN — MEPERIDINE HYDROCHLORIDE 25 MG: 25 INJECTION INTRAMUSCULAR; INTRAVENOUS; SUBCUTANEOUS at 15:18

## 2019-01-17 RX ADMIN — ALBUMIN (HUMAN) 500 ML: 12.5 SOLUTION INTRAVENOUS at 11:30

## 2019-01-17 RX ADMIN — DEXMEDETOMIDINE HYDROCHLORIDE 0.5 MCG/KG/HR: 100 INJECTION, SOLUTION, CONCENTRATE INTRAVENOUS at 14:29

## 2019-01-17 RX ADMIN — NOREPINEPHRINE BITARTRATE 0.03 MCG/KG/MIN: 8 SOLUTION at 19:56

## 2019-01-17 RX ADMIN — ROCURONIUM BROMIDE 100 MG: 10 SOLUTION INTRAVENOUS at 07:09

## 2019-01-17 RX ADMIN — SUFENTANIL CITRATE 50 MCG: 50 INJECTION, SOLUTION EPIDURAL; INTRAVENOUS at 09:45

## 2019-01-17 RX ADMIN — AMINOCAPROIC ACID 10 G: 250 INJECTION, SOLUTION INTRAVENOUS at 07:30

## 2019-01-17 RX ADMIN — OXYCODONE HYDROCHLORIDE AND ACETAMINOPHEN 2 TABLET: 5; 325 TABLET ORAL at 14:31

## 2019-01-17 RX ADMIN — ONDANSETRON 4 MG: 2 INJECTION INTRAMUSCULAR; INTRAVENOUS at 19:13

## 2019-01-17 RX ADMIN — POTASSIUM CHLORIDE AND DEXTROSE MONOHYDRATE 30 ML/HR: 150; 5 INJECTION, SOLUTION INTRAVENOUS at 11:29

## 2019-01-17 RX ADMIN — PHENYLEPHRINE HYDROCHLORIDE 2 MCG/KG/MIN: 10 INJECTION INTRAVENOUS at 21:42

## 2019-01-17 RX ADMIN — MUPIROCIN 10 APPLICATION: 20 OINTMENT TOPICAL at 06:40

## 2019-01-17 RX ADMIN — CEFUROXIME 1.5 G: 1.5 INJECTION, POWDER, FOR SOLUTION INTRAVENOUS at 10:31

## 2019-01-17 RX ADMIN — SUFENTANIL CITRATE 50 MCG: 50 INJECTION, SOLUTION EPIDURAL; INTRAVENOUS at 07:30

## 2019-01-17 RX ADMIN — PHENYLEPHRINE HYDROCHLORIDE 200 MCG: 10 INJECTION INTRAVENOUS at 07:25

## 2019-01-17 NOTE — ANESTHESIA PROCEDURE NOTES
Central Line      Patient location during procedure: OR  Stop Time:1/17/2019 7:28 AM  Indications: vascular access  Staff  Anesthesiologist: Bernabe Lund MD  Preanesthetic Checklist  Completed: patient identified, site marked, surgical consent, pre-op evaluation, timeout performed, IV checked, risks and benefits discussed and monitors and equipment checked  Central Line Prep  Sterile Tech:cap, gloves, gown, mask and sterile barriers  Prep: chloraprep  Patient monitoring: blood pressure monitoring, continuous pulse oximetry and EKG  Central Line Procedure  Laterality:right  Location:internal jugular  Catheter Type:Cordis and [place]-Robert  Catheter Size:9 Fr  Guidance:ultrasound guided  PROCEDURE NOTE/ULTRASOUND INTERPRETATION.  Using ultrasound guidance the potential vascular sites for insertion of the catheter were visualized to determine the patency of the vessel to be used for vascular access.  After selecting the appropriate site for insertion, the needle was visualized under ultrasound being inserted into the internal jugular vein, followed by ultrasound confirmation of wire and catheter placement. There were no abnormalities seen on ultrasound; an image was taken; and the patient tolerated the procedure with no complications.   Assessment  Post procedure:biopatch applied, line sutured, occlusive dressing applied and secured with tape  Assessement:blood return through all ports, free fluid flow and chest x-ray ordered  Complications:no  Patient Tolerance:patient tolerated the procedure well with no apparent complications  Additional Notes  Pa cath placed no difficulty

## 2019-01-17 NOTE — ANESTHESIA POSTPROCEDURE EVALUATION
Patient: Yosef Hardy    Procedure Summary     Date:  01/17/19 Room / Location:   [facility] 17 /  [facility]    Anesthesia Start:  0705 Anesthesia Stop:  1130    Procedure:  MEDIAN STERNOTOMY, CORONARY ARTERY BYPASS GRAFT X  4, UTILIZING THE LEFT INTERNAL MAMMARY ARTERY, AND  EVH OF THE RIGHT GREATER SAPHENOUS VEIN (N/A Chest) Diagnosis:      Surgeon:  Gato Coppola MD Provider:  Bernabe Lund MD    Anesthesia Type:  general ASA Status:  4          Anesthesia Type: general  Last vitals  BP   161/90(Right arm 150/86) (01/17/19 0630)   Temp   98 °F (36.7 °C) (01/17/19 0630)   Pulse   70 (01/17/19 0630)   Resp   16 (01/17/19 0630)     SpO2   97 % (01/17/19 1130)     Post Anesthesia Care and Evaluation    Patient location during evaluation: ICU  Patient participation: complete - patient cannot participate  Level of consciousness: lethargic  Pain score: 0  Pain management: adequate  Airway patency: patent  Anesthetic complications: No anesthetic complications  PONV Status: none  Cardiovascular status: acceptable  Respiratory status: acceptable  Hydration status: acceptable

## 2019-01-17 NOTE — OP NOTE
Operative Report    Preop Diagnosis: Coronary artery disease.  Hypertension.  Hyperlipidemia.  The percent left main coronary disease            Procedure: Coronary artery bypass grafting ×4.  Left internal mammary artery to left anterior descending.  Saphenous vein graft to the diagonal branch the LAD.  Saphenous vein graft sequence between obtuse marginal 1 and obtuse marginal 2.  Endoscopic harvesting of the right great saphenous vein.        Surgeons: Gato Coppola M.D. as surgeon and Alvaro HOLLIDAY with the assistance        Indication: Patient with the above listed medical problems.  He has significant coronary artery disease.  He had been undergoing chelation therapy without success.  Multiple over-the-counter nutritional supplements.  He understood the surgery was stroke bleeding infection death and renal failure and agreed to proceed.        Description: Supine position sterile prep and drape right great saphenous vein harvested endoscopically after administration of intravenous antibiotics.  Median sternotomy performed left internal mammary was harvested and the patient fully heparinized.  Cannulas first the ascending aorta patient begun on cardiopulmonary bypass and aorta crossclamped.  First saphenous vein graft was sequenced in side-to-side fashion to obtuse marginal 1 and end to side fashion to obtuse marginal 2.  Saphenous vein graft sutured end-to-side fashion to the diagonal branch of LAD.  Left internal mammary was sutured to the left anterior descending coronary.  The posterior descending was small diffusely diseased and nongraftable.  The 2 proximal vein graft sutured to the ascending aorta and marked with a radio opaque washer.  Patient went from bypass without difficulty protamine was given to reverse the heparin.  Sternum was closed with wire fashion skin with suture and the sponge and needle count reported as correct.      Please note that portions of this note were completed with a voice  recognition program. Efforts were made to edit the dictations, but occasionally words are mistranscribed.

## 2019-01-17 NOTE — CONSULTS
CTS Consult    Patient Care Team:  Mario Bashir MD as PCP - General (Internal Medicine)      Reason for Consult:  Evaluate for coronary surgery    HPI  Patient is a 73 y.o. male presents with chest tightness radiating into the jaw and the neck.  Says that this occurred for nearly 2 months.  When this tightness occurs there is associated shortness of breath.  Both these symptoms occur with minimal activity but resolved with rest.  He recently underwent cardiac catheterization which demonstrated critical coronary disease with an occluded right coronary and 90% left main.  Interestingly he had previously undergone chelation  therapy for vascular disease.  Apparently without success      Review of Systems  Constitutional: Negative for malaise/fatigue.  Negative for chills, fever, night sweats and weight loss.  HENT: Negative for hearing loss, odynophagia and sore throat.    Cardiovascular: Negative for claudication but positive for dyspnea on exertion.  Positive  for chest pain but negative for leg swelling, orthopnea and palpitations.  Respiratory: Negative for shortness of breath.  Negative for cough and hemoptysis.  Endocrine:  Negative for cold intolerance, heat intolerance, polydipsia, polyphagia and polyuria.  Hematologic/Lymphatic: Negative for easy bruising/bleeding.  Musculoskeletal: Negative for joint pain, joint swelling and myalgias.  Gastrointestinal: Negative for abdominal pain, constipation, diarrhea, hematemesis, hematochezia, melena, nausea and vomiting.  Genitourinary: Negative for dysuria, frequency and hematuria.  Neurological: Negative for focal weakness, headaches, numbness and seizures.  Psychiatric/Behavioral: Negative for depression and suicidal ideas.  The patient is not nervous/anxious.  All other systems are reviewed and are negative.      History  Past Medical History:   Diagnosis Date   • Coronary artery disease    • Disease of thyroid gland    • Hyperlipidemia    • Hypertension       Past Surgical History:   Procedure Laterality Date   • CARDIAC CATHETERIZATION N/A 1/16/2019    Procedure: Left Heart Cath;  Surgeon: Theo Piedra MD;  Location: [facility] CATH INVASIVE LOCATION;  Service: Cardiovascular   • TONSILLECTOMY AND ADENOIDECTOMY       History reviewed. No pertinent family history.  Social History     Tobacco Use   • Smoking status: Former Smoker     Years: 1.00     Types: Cigarettes   • Smokeless tobacco: Former User     Types: Chew   Substance Use Topics   • Alcohol use: No     Frequency: Never   • Drug use: No     Medications Prior to Admission   Medication Sig Dispense Refill Last Dose   • ascorbic acid (VITAMIN C) 1000 MG tablet Take 1,000 mg by mouth 2 (Two) Times a Day.   1/15/2019 at Unknown time   • aspirin 81 MG EC tablet Take 81 mg by mouth Daily.   1/16/2019 at Unknown time   • Coenzyme Q10-Fish Oil-Vit E (CO-Q 10 OMEGA-3 FISH OIL PO) Take 1 tablet by mouth.   1/15/2019 at Unknown time   • Flaxseed, Linseed, (FLAX SEEDS PO) Take 1 dose by mouth.   1/15/2019 at Unknown time   • ISIDRO PO Take 1 dose by mouth 2 (Two) Times a Day.   1/15/2019 at Unknown time   • L-Proline 500 MG capsule Take 1 capsule by mouth Every Morning.      • Lysine HCl (L-LYSINE) 500 MG tablet tablet Take 1,000 mg by mouth Daily.   1/15/2019 at Unknown time   • Magnesium Bisglycinate 100 MG tablet Take 1 tablet by mouth 2 (Two) Times a Day.   1/15/2019 at Unknown time   • NATTOKINASE PO Take 100 mg by mouth Every Morning.   1/15/2019 at Unknown time   • niacin 500 MG tablet Take 500 mg by mouth Every Night.   1/15/2019 at Unknown time   • nitroglycerin (NITROSTAT) 0.4 MG SL tablet Place 0.4 mg under the tongue Every 5 (Five) Minutes As Needed for Chest Pain. Take no more than 3 doses in 15 minutes.   1/15/2019 at Unknown time   • NON FORMULARY anastrazole 0.5mg twice weekly   Past Week at Unknown time   • NON FORMULARY 1 dose 2 (Two) Times a Day. POTASSIUM GLYCINATE   1/15/2019 at Unknown time    • THYROID PO Take 1 dose by mouth Every Morning. One grain   1/16/2019 at Unknown time   • Ubiquinol (QUNOL COQ10/UBIQUINOL/IRENE) 100 MG capsule Take 1 capsule by mouth 2 (Two) Times a Day.   1/15/2019 at Unknown time   • Zn-Pyg Afri-Nettle-Saw Palmet (SAW PALMETTO COMPLEX PO) Take 1 dose by mouth.   1/15/2019 at Unknown time     Allergies:  Patient has no known allergies.    Objective    Vital Signs  Temp:  [97.4 °F (36.3 °C)-98.8 °F (37.1 °C)] 98.8 °F (37.1 °C)  Heart Rate:  [53-71] 54  Resp:  [16-20] 16  BP: (120-196)/() 120/67    Physical Exam:CONSTITUTIONAL: Alert and conversant, Well dressed, Well nourished, No acute distress  EYES: Sclera clean, Anicteric, Pupils equal  ENT: No nasal deviation, Trachea midline  NECK: No neck masses, Supple  LUNGS: No wheezing, Cough, non-congested  HEART: No rubs, No murmurs  GASTROINTESTINAL: Soft, non-distended, No masses, Non tender  to palpation, normal bowel sounds  NEURO: No motor deficits, No sensory deficits, Cranial Nerves 2 through 12 grossly intact  PSYCHIATRIC: Oriented to person, place and time, No memory deficits, Mood appropriate  VASCULAR: No carotid bruits, Femoral pulses palpable and symmetric  MUSKULOSKELETAL: No extremity trauma or extremity asymmetry            Data Review:  Results from last 7 days   Lab Units 01/16/19  1203   WBC 10*3/mm3 5.98   HEMOGLOBIN g/dL 14.6   HEMATOCRIT % 42.2   PLATELETS 10*3/mm3 175     Results from last 7 days   Lab Units 01/16/19  1203   SODIUM mmol/L 139   POTASSIUM mmol/L 4.0   CHLORIDE mmol/L 106   CO2 mmol/L 28.0   BUN mg/dL 16   CREATININE mg/dL 0.93   GLUCOSE mg/dL 94   CALCIUM mg/dL 9.5     Coagulation: No results found for: INR, APTT  Cardiac markers:       Invalid input(s): TROPONIN  ABGs:       Invalid input(s): PO2        Radiology:      Imaging Results (last 72 hours)     ** No results found for the last 72 hours. **            Assessment:        Unstable angina (CMS/HCC)  This patient has unstable  angina with occluded right coronary artery and 90% left main coronary disease.  Plan for coronary bypass grafting surgery this morning.  Patient understands there is risk of stroke bleeding infection death and renal failure.  He is agreeable to proceed no guarantees been made as to outcome      Plan:  Will proceed with surgery today     Please note that portions of this note were completed with a voice recognition program. Efforts were made to edit the dictations, but occasionally words are mistranscribed.    Gato Coppola MD  01/17/19  6:16 AM

## 2019-01-17 NOTE — ANESTHESIA PROCEDURE NOTES
Arterial Line      Patient location during procedure: pre-op  Stop Time:1/17/2019 6:40 AM       Line placed for hemodynamic monitoring.  Performed By   Anesthesiologist: Bernabe Lund MD  Preanesthetic Checklist  Completed: patient identified, site marked, surgical consent, pre-op evaluation, timeout performed, IV checked, risks and benefits discussed and monitors and equipment checked  Arterial Line Prep   Sterile Tech: cap, gloves and sterile barriers  Prep: ChloraPrep  Patient monitoring: blood pressure monitoring, continuous pulse oximetry and EKG  Arterial Line Procedure   Laterality:right  Location:  radial artery  Catheter size: 20 G   Guidance: palpation technique  Number of attempts: 1  Successful placement: yes          Post Assessment   Dressing Type: line sutured, occlusive dressing applied, secured with tape and wrist guard applied.   Complications no  Circ/Move/Sens Assessment: normal and unchanged.   Patient Tolerance: patient tolerated the procedure well with no apparent complications

## 2019-01-17 NOTE — ANESTHESIA PREPROCEDURE EVALUATION
Anesthesia Evaluation     Patient summary reviewed and Nursing notes reviewed   NPO Solid Status: > 8 hours  NPO Liquid Status: > 8 hours           Airway   Mallampati: II  TM distance: >3 FB  Neck ROM: limited  Possible difficult intubation  Dental - normal exam     Pulmonary - normal exam   Cardiovascular   Exercise tolerance: poor (<4 METS)    Rhythm: regular  Rate: normal        Neuro/Psych  GI/Hepatic/Renal/Endo      Musculoskeletal     Abdominal    Substance History      OB/GYN          Other                        Anesthesia Plan    ASA 4     general     intravenous induction   Anesthetic plan, all risks, benefits, and alternatives have been provided, discussed and informed consent has been obtained with: patient.    A line pa cath return to icu post p  Post op ventilation

## 2019-01-17 NOTE — PROGRESS NOTES
[facility]       LOS: 0 days   Patient Care Team:  Mario Bashir MD as PCP - General (Internal Medicine)        Subjective       Patient Denies:  Intubated      Vital Signs  Temp:  [95.9 °F (35.5 °C)-98.8 °F (37.1 °C)] 95.9 °F (35.5 °C)  Heart Rate:  [53-71] 58  Resp:  [12-20] 12  BP: ()/() 89/45  Arterial Line BP: (82-98)/(1-39) 92/39  FiO2 (%):  [100 %] 100 %    Intake/Output Summary (Last 24 hours) at 1/17/2019 1244  Last data filed at 1/17/2019 1129  Gross per 24 hour   Intake 1917 ml   Output 2655 ml   Net -738 ml     I/O this shift:  In: 1917 [I.V.:400; Blood:1017; IV Piggyback:500]  Out: 2205 [Urine:1025; Blood:800; Chest Tube:380]    Physical Exam:     General Appearance:    Sedated, in no acute distress       Neck:   No adenopathy, supple, trachea midline, no thyromegaly, no JVD       Lungs:     Clear to auscultation anteriorly,respirations regular, even and                  unlabored    Heart:    Regular rhythm and normal rate, normal S1 and S2, no            murmur, no gallop, no rub, no click   Chest Wall:    No abnormalities observed   Abdomen:     Normal bowel sounds, no masses, no organomegaly, soft               Extremities:   No edema, no cyanosis, no redness   Pulses:   Pulses palpable and equal bilaterally     Results Review:     I reviewed the patient's new clinical results.      WBC WBC   Date/Time Value Ref Range Status   01/17/2019 1146 9.37 3.50 - 10.80 10*3/mm3 Final   01/16/2019 1203 5.98 3.50 - 10.80 10*3/mm3 Final            HGB Hemoglobin   Date/Time Value Ref Range Status   01/17/2019 1146 9.4 (L) 13.1 - 17.5 g/dL Final   01/17/2019 1051 8.8 (L) 12.0 - 17.0 g/dL Final   01/17/2019 1031 8.8 (L) 12.0 - 17.0 g/dL Final   01/17/2019 0954 8.8 (L) 12.0 - 17.0 g/dL Final   01/17/2019 0926 9.2 (L) 12.0 - 17.0 g/dL Final   01/17/2019 0902 8.8 (L) 12.0 - 17.0 g/dL Final   01/17/2019 0821 12.9 12.0 - 17.0 g/dL  Final   01/17/2019 0710 14.6 12.0 - 17.0 g/dL Final   01/16/2019 1203 14.6 13.1 - 17.5 g/dL Final           HCT Hematocrit   Date/Time Value Ref Range Status   01/17/2019 1146 27.2 (L) 38.9 - 50.9 % Final   01/17/2019 1051 26 (L) 38 - 51 % Final   01/17/2019 1031 26 (L) 38 - 51 % Final   01/17/2019 0954 26 (L) 38 - 51 % Final   01/17/2019 0926 27 (L) 38 - 51 % Final   01/17/2019 0902 26 (L) 38 - 51 % Final   01/17/2019 0821 38 38 - 51 % Final   01/17/2019 0710 43 38 - 51 % Final   01/16/2019 1203 42.2 38.9 - 50.9 % Final            Platlets Platelets   Date/Time Value Ref Range Status   01/17/2019 1146 151 150 - 450 10*3/mm3 Final   01/16/2019 1203 175 150 - 450 10*3/mm3 Final     Sodium  Sodium   Date/Time Value Ref Range Status   01/16/2019 1203 139 132 - 146 mmol/L Final     Potassium  Potassium   Date/Time Value Ref Range Status   01/16/2019 1203 4.0 3.5 - 5.5 mmol/L Final     Chloride  Chloride   Date/Time Value Ref Range Status   01/16/2019 1203 106 99 - 109 mmol/L Final     BicarbonateNo results found for: PLASMABICARB    BUN BUN   Date/Time Value Ref Range Status   01/16/2019 1203 16 9 - 23 mg/dL Final      Creatinine Creatinine   Date/Time Value Ref Range Status   01/16/2019 1203 0.93 0.60 - 1.30 mg/dL Final      Calcium Calcium   Date/Time Value Ref Range Status   01/16/2019 1203 9.5 8.7 - 10.4 mg/dL Final      Mag @RESULFAST(MG:3)@        PT/INR:       Lab Results   Component Value Date    PROTIME 15.3 (H) 01/17/2019    PROTIME 13.2 01/17/2019    INR 1.27 (H) 01/17/2019    INR 1.05 01/17/2019      Troponin I:  No results found for: TROPONINI No results found for: CKTOTAL, CKMB, CKMBINDEX, POCRTROPI, TROPONINT      protamine      albumin human      anti-inhibitor coagulant complex 1,500 Units Intravenous Once   [START ON 1/18/2019] aspirin 325 mg Oral Daily   aspirin 325 mg Oral Once   atorvastatin 40 mg Oral Nightly   cefuroxime 1.5 g Intravenous Q8H   chlorhexidine 15 mL Mouth/Throat Q12H   [START ON  1/18/2019] metoprolol tartrate 12.5 mg Oral Q12H   metoprolol tartrate 2.5 mg Intravenous Q6H   pharmacy consult - MTM  Does not apply Daily   protamine      protamine 50 mg Intravenous Once   sennosides-docusate sodium 2 tablet Oral BID       dexmedetomidine 0.2-1.5 mcg/kg/hr    dextrose 5 % with KCl 20 mEq 30 mL/hr Last Rate: 30 mL/hr (01/17/19 1130)   DOBUTamine 2-20 mcg/kg/min    DOPamine 2-20 mcg/kg/min Last Rate: 6 mcg/kg/min (01/17/19 1129)   EPINEPHrine 0.02-0.3 mcg/kg/min    insulin regular infusion 1 unit/mL (CCU use) 0-50 Units/hr    lactated ringers 9 mL/hr    niCARdipine 5-15 mg/hr    nitroglycerin 5-200 mcg/min    norepinephrine 0.02-0.3 mcg/kg/min    phenylephrine 0.5-3 mcg/kg/min    propofol 5-50 mcg/kg/min Last Rate: 25 mcg/kg/min (01/17/19 1129)   sodium chloride 30 mL/hr    vasopressin 0.02-0.1 Units/min        Assessment/Plan     Patient Active Problem List   Diagnosis Code   • Unstable angina (CMS/[facility]) I20.0     Hemodynamically stable early post 4 vessel CABG  Normal EF    OSCAR Amador  01/17/19  12:44 PM

## 2019-01-17 NOTE — RESEARCH
RISK SCORES Procedure: Isolated CAB CALCULATE   Risk of Mortality:  0.529%    Renal Failure:  0.473%    Permanent Stroke:  0.334%    Prolonged Ventilation:  2.110%    DSW Infection:  0.120%    Reoperation:  1.298%    Morbidity or Mortality:  3.738%    Short Length of Stay:  71.211%    Long Length of Stay:  1.236%

## 2019-01-17 NOTE — PROGRESS NOTES
"Intensivist Note     1/17/2019  Hospital Day: 0  Day of Surgery      Mr. Yosef Hardy, 73 y.o. 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 Patient had apparently received chelation therapy for vascular disease.    Patient subsequently presented with a several month history of exercise-induced chest discomfort manifesting as tightness radiating to the neck and jaw. This also occurred after a large meal. He also noted associated dyspnea with this discomfort. Underwent a stress echo was abnormal and was subsequently referred to Dr. Piedra for cardiac catheterization. Catheterization was performed 1/16/19 and revealed critical coronary disease with an occluded RCA and a 90% left main.    Patient underwent uneventful CABG ×4 today and returns to the ICU on ventilatory support. He received 1 unit of PRBC, 2 platelets, and Feiba prior to arriving in the ICU. ACT is 115 so is to receive another 50 of protamine. He is on 3 mcg/kg/min of dopamine is receiving albumin infusion at this time. Blood pressures are excellent, and oxygenating well on present level of ventilatory support.    The patient's relevant past medical, surgical and social history were reviewed and updated in Epic as appropriate.    OBJECTIVE     BP (!) 89/45   Pulse 73   Temp 95.9 °F (35.5 °C) (Core)   Resp 12   Ht 170.2 cm (67\")   Wt 83.9 kg (185 lb)   SpO2 98%   BMI 28.98 kg/m²   Oxygen Concentration (%): 100       Flowsheet Rows      First Filed Value   Admission Height  170.2 cm (67\") Documented at 01/16/2019 1200   Admission Weight  84.2 kg (185 lb 10 oz) Documented at 01/16/2019 1200    "     Intake & Output (last day)       01/16 0701 - 01/17 0700 01/17 0701 - 01/18 0700    I.V. (mL/kg)  400 (4.8)    Blood  1017    IV Piggyback  500    Total Intake(mL/kg)  1917 (22.8)    Urine (mL/kg/hr) 450 1025 (2)    Blood  800    Chest Tube  380    Total Output 450 2205    Net -450 -288          Urine Unmeasured Occurrence 1 x           Exam:  General Exam:  Well-developed well-nourished elderly white male intubated and on ventilatory support.  HEENT: Pupils equal and reactive. Disconjugate gaze. ETT and NG tube in place.  Neck:                          Supple, no JVD, thyromegaly, or adenopathy. Right IJ [place] in place.  Lungs: Slightly diminished breath sounds but no wheezes, rales, rhonchi.  Chest:                         MT/CT tubes ×4. No excessive bleeding or air leak.  Cardiovascular: RRR without murmurs or gallops. Soft pericardial rub.  Abdomen: Soft nontender without organomegaly or masses.   and rectal: Castillo catheter in place.  Extremities: No cyanosis clubbing edema.  Neurologic:                 Residual sedation/anesthesia. Unable to evaluate.    Chest X-Ray 1/17/19: Normal heart size. ET tube in position. Right IJ [place] in position. MT/pleural tubes in position. Mild bibasilar platelike atelectasis.    INFUSIONS  DOPamine 2-20 mcg/kg/min Last Rate: 6 mcg/kg/min (01/17/19 1129)   propofol 5-50 mcg/kg/min Last Rate: 25 mcg/kg/min (01/17/19 1129)       Results from last 7 days   Lab Units 01/17/19  1146 01/17/19  1051 01/17/19  1031  01/16/19  1203   WBC 10*3/mm3 9.37  --   --   --  5.98   HEMOGLOBIN g/dL 9.4*  --   --   --  14.6   HEMOGLOBIN, POC g/dL  --  8.8* 8.8*   < >  --    HEMATOCRIT % 27.2*  --   --   --  42.2   HEMATOCRIT POC %  --  26* 26*   < >  --    PLATELETS 10*3/mm3 151  --   --   --  175    < > = values in this interval not displayed.     Results from last 7 days   Lab Units 01/17/19  1146 01/16/19  1203   SODIUM mmol/L 140 139   POTASSIUM mmol/L 4.2 4.0   CHLORIDE mmol/L 107 106    CO2 mmol/L 25.0 28.0   BUN mg/dL 11 16   CREATININE mg/dL 1.06 0.93   GLUCOSE mg/dL 100 94   CALCIUM mg/dL 9.6 9.5     Results from last 7 days   Lab Units 01/17/19  1146   MAGNESIUM mg/dL 3.7*   PHOSPHORUS mg/dL 2.7           No results found for: SEDRATE  No results found for: BNP  No results found for: CKTOTAL, CKMB, CKMBINDEX, TROPONINI, TROPONINT  No results found for: TSH  No results found for: LACTATE  No results found for: CORTISOL    Results from last 7 days   Lab Units 01/17/19  1140 01/17/19  1051 01/17/19  1031 01/17/19  0954 01/17/19  0926   PH, ARTERIAL pH units 7.411 7.48 7.52 7.39 7.47   PCO2, ARTERIAL mm Hg 39.7  --   --   --   --    PO2 ART mm Hg 311.0*  --   --   --   --    HCO3 ART mmol/L 25.2  --   --   --   --    FIO2 % 100  --   --   --   --        Vent Settings:  IMV and pressure support  Vt (Set, L): 0.6 L  Resp Rate (Set): 12  Pressure Support (cm H2O): 10 cm H20  FiO2 (%): 40 %  PEEP/CPAP (cm H2O): 5 cm H20    Minute Ventilation (L/min) (Obs): 7.23 L/min  Resp Rate (Observed) Vent: 12  I:E Ratio (Obs): 1:3.00  PIP Observed (cm H2O): 23 cm H2O       I reviewed the patient's results, images and medication.    Assessment/Plan   ASSESSMENT        Severe CAD with occluded RCA and 90% left main    Unstable angina (CMS/[facility])    S/P CABG x 4 on 1/17/19    Hypertension    Hyperlipidemia    Hypothyroidism on replacement      DISCUSSION: Appears to be doing well postop. Ventilatory support adequate and will wean when more alert. Doing well hemodynamically with only a minimal amount of dopamine. Urine output adequate. Is to receive more protamine prior to right femoral arterial line removal.    PLAN     1. Wean off dopamine soon  2. Standard ventilator wean when alert  3. Ulcer and DVT prophylaxis  4. Glycemic control with insulin drip as needed    Plan of care and goals reviewed with mulitdisciplinary team at daily rounds.    I discussed the patient's findings and my recommendations with patient and  nursing staff    Time spent Critical care 25 min (It does not include procedure time).    Yosef Donahue MD  Intensive Care Medicine  01/17/19 1:10 PM

## 2019-01-17 NOTE — ANESTHESIA PROCEDURE NOTES
Airway  Urgency: elective    End Time:1/17/2019 7:11 AM  Airway not difficult    General Information and Staff    Patient location during procedure: OR  Anesthesiologist: Bernabe Lund MD    Indications and Patient Condition  Indications for airway management: airway protection    Preoxygenated: yes  MILS not maintained throughout  Mask difficulty assessment: 1 - vent by mask    Final Airway Details  Final airway type: endotracheal airway      Successful airway: ETT  Cuffed: yes   Successful intubation technique: video laryngoscopy  Facilitating devices/methods: intubating stylet  Endotracheal tube insertion site: oral  Blade: Donte  Blade size: 3  ETT size (mm): 7.5  Cormack-Lehane Classification: grade III - view of epiglottis only  Placement verified by: chest auscultation and capnometry   Measured from: lips  ETT to lips (cm): 20  Number of attempts at approach: 1    Additional Comments  Negative epigastric sounds, Breath sound equal bilaterally with symmetric chest rise and fall

## 2019-01-17 NOTE — PLAN OF CARE
Problem: Patient Care Overview  Goal: Plan of Care Review  Outcome: Ongoing (interventions implemented as appropriate)   01/16/19 2200   Coping/Psychosocial   Plan of Care Reviewed With patient   Plan of Care Review   Progress no change   OTHER   Outcome Summary Patient denies pain/chest pain; No signs of bleeding; VSS; CABG consult pending with Dr. Coppola; Will continue to monitor     Goal: Individualization and Mutuality  Outcome: Ongoing (interventions implemented as appropriate)    Goal: Discharge Needs Assessment  Outcome: Ongoing (interventions implemented as appropriate)    Goal: Interprofessional Rounds/Family Conf  Outcome: Ongoing (interventions implemented as appropriate)      Problem: Cardiac Catheterization (Diagnostic/Interventional) (Adult)  Goal: Signs and Symptoms of Listed Potential Problems Will be Absent, Minimized or Managed (Cardiac Catheterization)  Outcome: Ongoing (interventions implemented as appropriate)    Goal: Anesthesia/Sedation Recovery  Outcome: Outcome(s) achieved Date Met: 01/16/19

## 2019-01-18 ENCOUNTER — APPOINTMENT (OUTPATIENT)
Dept: GENERAL RADIOLOGY | Facility: HOSPITAL | Age: 74
End: 2019-01-18

## 2019-01-18 LAB
ABO + RH BLD: NORMAL
ABO + RH BLD: NORMAL
ACT BLD: 125 SECONDS (ref 82–152)
ALBUMIN SERPL-MCNC: 3.94 G/DL (ref 3.2–4.8)
ANION GAP SERPL CALCULATED.3IONS-SCNC: 8 MMOL/L (ref 3–11)
BASOPHILS # BLD AUTO: 0.02 10*3/MM3 (ref 0–0.2)
BASOPHILS NFR BLD AUTO: 0.2 % (ref 0–1)
BH BB BLOOD EXPIRATION DATE: NORMAL
BH BB BLOOD EXPIRATION DATE: NORMAL
BH BB BLOOD TYPE BARCODE: 2800
BH BB BLOOD TYPE BARCODE: 6200
BH BB DISPENSE STATUS: NORMAL
BH BB DISPENSE STATUS: NORMAL
BH BB PRODUCT CODE: NORMAL
BH BB PRODUCT CODE: NORMAL
BH BB UNIT NUMBER: NORMAL
BH BB UNIT NUMBER: NORMAL
BUN BLD-MCNC: 20 MG/DL (ref 9–23)
BUN/CREAT SERPL: 10.9 (ref 7–25)
CALCIUM SPEC-SCNC: 8.3 MG/DL (ref 8.7–10.4)
CHLORIDE SERPL-SCNC: 109 MMOL/L (ref 99–109)
CO2 SERPL-SCNC: 22 MMOL/L (ref 20–31)
CREAT BLD-MCNC: 1.84 MG/DL (ref 0.6–1.3)
DEPRECATED RDW RBC AUTO: 44.8 FL (ref 37–54)
EOSINOPHIL # BLD AUTO: 0.01 10*3/MM3 (ref 0–0.3)
EOSINOPHIL NFR BLD AUTO: 0.1 % (ref 0–3)
ERYTHROCYTE [DISTWIDTH] IN BLOOD BY AUTOMATED COUNT: 13.4 % (ref 11.3–14.5)
GFR SERPL CREATININE-BSD FRML MDRD: 36 ML/MIN/1.73
GLUCOSE BLD-MCNC: 116 MG/DL (ref 70–100)
GLUCOSE BLDC GLUCOMTR-MCNC: 122 MG/DL (ref 70–130)
GLUCOSE BLDC GLUCOMTR-MCNC: 128 MG/DL (ref 70–130)
GLUCOSE BLDC GLUCOMTR-MCNC: 128 MG/DL (ref 70–130)
GLUCOSE BLDC GLUCOMTR-MCNC: 129 MG/DL (ref 70–130)
GLUCOSE BLDC GLUCOMTR-MCNC: 140 MG/DL (ref 70–130)
GLUCOSE BLDC GLUCOMTR-MCNC: 146 MG/DL (ref 70–130)
GLUCOSE BLDC GLUCOMTR-MCNC: 150 MG/DL (ref 70–130)
GLUCOSE BLDC GLUCOMTR-MCNC: 164 MG/DL (ref 70–130)
GLUCOSE BLDC GLUCOMTR-MCNC: 77 MG/DL (ref 70–130)
HCT VFR BLD AUTO: 27.1 % (ref 38.9–50.9)
HCT VFR BLD AUTO: 27.3 % (ref 38.9–50.9)
HGB BLD-MCNC: 9 G/DL (ref 13.1–17.5)
HGB BLD-MCNC: 9.3 G/DL (ref 13.1–17.5)
IMM GRANULOCYTES # BLD AUTO: 0.03 10*3/MM3 (ref 0–0.03)
IMM GRANULOCYTES NFR BLD AUTO: 0.2 % (ref 0–0.6)
INR PPP: 1.34 (ref 0.85–1.16)
LYMPHOCYTES # BLD AUTO: 1.06 10*3/MM3 (ref 0.6–4.8)
LYMPHOCYTES NFR BLD AUTO: 8.7 % (ref 24–44)
MAGNESIUM SERPL-MCNC: 2.7 MG/DL (ref 1.3–2.7)
MCH RBC QN AUTO: 30.4 PG (ref 27–31)
MCHC RBC AUTO-ENTMCNC: 33.2 G/DL (ref 32–36)
MCV RBC AUTO: 91.6 FL (ref 80–99)
MONOCYTES # BLD AUTO: 1.1 10*3/MM3 (ref 0–1)
MONOCYTES NFR BLD AUTO: 9.1 % (ref 0–12)
NEUTROPHILS # BLD AUTO: 9.96 10*3/MM3 (ref 1.5–8.3)
NEUTROPHILS NFR BLD AUTO: 81.9 % (ref 41–71)
PHOSPHATE SERPL-MCNC: 3.8 MG/DL (ref 2.4–5.1)
PLATELET # BLD AUTO: 194 10*3/MM3 (ref 150–450)
PMV BLD AUTO: 10.7 FL (ref 6–12)
POTASSIUM BLD-SCNC: 4.2 MMOL/L (ref 3.5–5.5)
PROTHROMBIN TIME: 16 SECONDS (ref 11.2–14.3)
RBC # BLD AUTO: 2.96 10*6/MM3 (ref 4.2–5.76)
SODIUM BLD-SCNC: 139 MMOL/L (ref 132–146)
UNIT  ABO: NORMAL
UNIT  ABO: NORMAL
UNIT  RH: NORMAL
UNIT  RH: NORMAL
WBC NRBC COR # BLD: 12.15 10*3/MM3 (ref 3.5–10.8)

## 2019-01-18 PROCEDURE — 25010000002 ONDANSETRON PER 1 MG: Performed by: PHYSICIAN ASSISTANT

## 2019-01-18 PROCEDURE — 85025 COMPLETE CBC W/AUTO DIFF WBC: CPT | Performed by: PHYSICIAN ASSISTANT

## 2019-01-18 PROCEDURE — 63710000001 INSULIN LISPRO (HUMAN) PER 5 UNITS: Performed by: INTERNAL MEDICINE

## 2019-01-18 PROCEDURE — 93010 ELECTROCARDIOGRAM REPORT: CPT | Performed by: INTERNAL MEDICINE

## 2019-01-18 PROCEDURE — 71045 X-RAY EXAM CHEST 1 VIEW: CPT

## 2019-01-18 PROCEDURE — 93005 ELECTROCARDIOGRAM TRACING: CPT | Performed by: PHYSICIAN ASSISTANT

## 2019-01-18 PROCEDURE — 82962 GLUCOSE BLOOD TEST: CPT

## 2019-01-18 PROCEDURE — 85610 PROTHROMBIN TIME: CPT | Performed by: PHYSICIAN ASSISTANT

## 2019-01-18 PROCEDURE — 25010000002 MORPHINE PER 10 MG: Performed by: THORACIC SURGERY (CARDIOTHORACIC VASCULAR SURGERY)

## 2019-01-18 PROCEDURE — 25010000002 PROMETHAZINE PER 50 MG: Performed by: THORACIC SURGERY (CARDIOTHORACIC VASCULAR SURGERY)

## 2019-01-18 PROCEDURE — 97163 PT EVAL HIGH COMPLEX 45 MIN: CPT

## 2019-01-18 PROCEDURE — 85014 HEMATOCRIT: CPT | Performed by: INTERNAL MEDICINE

## 2019-01-18 PROCEDURE — 99024 POSTOP FOLLOW-UP VISIT: CPT | Performed by: THORACIC SURGERY (CARDIOTHORACIC VASCULAR SURGERY)

## 2019-01-18 PROCEDURE — 83735 ASSAY OF MAGNESIUM: CPT | Performed by: PHYSICIAN ASSISTANT

## 2019-01-18 PROCEDURE — 99291 CRITICAL CARE FIRST HOUR: CPT | Performed by: INTERNAL MEDICINE

## 2019-01-18 PROCEDURE — 25010000002 CEFUROXIME: Performed by: PHYSICIAN ASSISTANT

## 2019-01-18 PROCEDURE — 85018 HEMOGLOBIN: CPT | Performed by: INTERNAL MEDICINE

## 2019-01-18 PROCEDURE — 80069 RENAL FUNCTION PANEL: CPT | Performed by: PHYSICIAN ASSISTANT

## 2019-01-18 RX ORDER — NICOTINE POLACRILEX 4 MG
15 LOZENGE BUCCAL
Status: DISCONTINUED | OUTPATIENT
Start: 2019-01-18 | End: 2019-01-21

## 2019-01-18 RX ORDER — DEXTROSE MONOHYDRATE 25 G/50ML
25 INJECTION, SOLUTION INTRAVENOUS
Status: DISCONTINUED | OUTPATIENT
Start: 2019-01-18 | End: 2019-01-21

## 2019-01-18 RX ORDER — PROMETHAZINE HYDROCHLORIDE 25 MG/ML
6.25 INJECTION, SOLUTION INTRAMUSCULAR; INTRAVENOUS ONCE
Status: COMPLETED | OUTPATIENT
Start: 2019-01-18 | End: 2019-01-18

## 2019-01-18 RX ORDER — VANCOMYCIN HYDROCHLORIDE 500 MG/10ML
INJECTION, POWDER, LYOPHILIZED, FOR SOLUTION INTRAVENOUS AS NEEDED
Status: DISCONTINUED | OUTPATIENT
Start: 2019-01-17 | End: 2019-01-18 | Stop reason: HOSPADM

## 2019-01-18 RX ORDER — PROMETHAZINE HYDROCHLORIDE 25 MG/ML
12.5 INJECTION, SOLUTION INTRAMUSCULAR; INTRAVENOUS EVERY 6 HOURS PRN
Status: DISCONTINUED | OUTPATIENT
Start: 2019-01-18 | End: 2019-01-18

## 2019-01-18 RX ADMIN — MORPHINE SULFATE 2 MG: 2 INJECTION, SOLUTION INTRAMUSCULAR; INTRAVENOUS at 12:14

## 2019-01-18 RX ADMIN — CEFUROXIME 1.5 G: 1.5 INJECTION, POWDER, FOR SOLUTION INTRAVENOUS at 18:31

## 2019-01-18 RX ADMIN — ONDANSETRON 4 MG: 2 INJECTION INTRAMUSCULAR; INTRAVENOUS at 00:14

## 2019-01-18 RX ADMIN — INSULIN LISPRO 2 UNITS: 100 INJECTION, SOLUTION INTRAVENOUS; SUBCUTANEOUS at 22:15

## 2019-01-18 RX ADMIN — PROMETHAZINE HYDROCHLORIDE 6.25 MG: 25 INJECTION INTRAMUSCULAR; INTRAVENOUS at 02:24

## 2019-01-18 RX ADMIN — ATORVASTATIN CALCIUM 40 MG: 40 TABLET, FILM COATED ORAL at 22:16

## 2019-01-18 RX ADMIN — CEFUROXIME 1.5 G: 1.5 INJECTION, POWDER, FOR SOLUTION INTRAVENOUS at 12:14

## 2019-01-18 RX ADMIN — NOREPINEPHRINE BITARTRATE 0.06 MCG/KG/MIN: 8 SOLUTION at 14:25

## 2019-01-18 RX ADMIN — HYDROCODONE BITARTRATE AND ACETAMINOPHEN 1 TABLET: 7.5; 325 TABLET ORAL at 14:33

## 2019-01-18 RX ADMIN — SENNOSIDES AND DOCUSATE SODIUM 2 TABLET: 8.6; 5 TABLET ORAL at 22:16

## 2019-01-18 RX ADMIN — ONDANSETRON 4 MG: 2 INJECTION INTRAMUSCULAR; INTRAVENOUS at 12:14

## 2019-01-18 RX ADMIN — SENNOSIDES AND DOCUSATE SODIUM 2 TABLET: 8.6; 5 TABLET ORAL at 10:35

## 2019-01-18 RX ADMIN — FAMOTIDINE 20 MG: 10 INJECTION, SOLUTION INTRAVENOUS at 22:17

## 2019-01-18 RX ADMIN — OXYCODONE HYDROCHLORIDE AND ACETAMINOPHEN 1 TABLET: 5; 325 TABLET ORAL at 22:25

## 2019-01-18 RX ADMIN — CEFUROXIME 1.5 G: 1.5 INJECTION, POWDER, FOR SOLUTION INTRAVENOUS at 02:24

## 2019-01-18 RX ADMIN — INSULIN LISPRO 2 UNITS: 100 INJECTION, SOLUTION INTRAVENOUS; SUBCUTANEOUS at 18:31

## 2019-01-18 RX ADMIN — ASPIRIN 325 MG: 325 TABLET, DELAYED RELEASE ORAL at 10:35

## 2019-01-18 NOTE — THERAPY EVALUATION
Acute Care - Physical Therapy Initial Evaluation  [facility]     Patient Name: Yosef Hardy  : 1945  MRN: [id number]  Today's Date: 2019   Onset of Illness/Injury or Date of Surgery: 19  Date of Referral to PT: 19  Referring Physician: MD Coppola      Admit Date: 2019    Visit Dx:     ICD-10-CM ICD-9-CM   1. Impaired functional mobility, balance, gait, and endurance Z74.09 V49.89   2. Unstable angina (CMS/HCC) I20.0 411.1     Patient Active Problem List   Diagnosis   • Unstable angina (CMS/HCC)   • Severe CAD with occluded RCA and 90% left main   • S/P CABG x 4 on 19   • Hypertension   • Hyperlipidemia   • Hypothyroidism on replacement     Past Medical History:   Diagnosis Date   • Coronary artery disease    • Disease of thyroid gland    • Hyperlipidemia    • Hypertension    • Seasonal allergies      Past Surgical History:   Procedure Laterality Date   • CARDIAC CATHETERIZATION N/A 2019    Procedure: Left Heart Cath;  Surgeon: Theo Piedra MD;  Location: [facility] CATH INVASIVE LOCATION;  Service: Cardiovascular   • CORONARY ARTERY BYPASS GRAFT N/A 2019    Procedure: MEDIAN STERNOTOMY, CORONARY ARTERY BYPASS GRAFT X  4, UTILIZING THE LEFT INTERNAL MAMMARY ARTERY, AND  EVH OF THE RIGHT GREATER SAPHENOUS VEIN;  Surgeon: Gato Coppola MD;  Location: [facility] OR;  Service: Cardiothoracic   • TONSILLECTOMY AND ADENOIDECTOMY     • VASECTOMY          PT ASSESSMENT (last 12 hours)      Physical Therapy Evaluation     Row Name 19 1400          PT Evaluation Time/Intention    Subjective Information  complains of;weakness;pain  -ORESTES     Document Type  evaluation  -ORESTES     Mode of Treatment  physical therapy  -ORESTES     Patient Effort  good  -ORESTES     Symptoms Noted During/After Treatment  fatigue;increased pain  -ORESTES     Row Name 19 1400          General Information    Patient Profile Reviewed?  yes  -ORESTES     Onset of Illness/Injury or Date of Surgery  19  -ORESTES      Referring Physician  MD Coppola  -ORESTES     Patient Observations  alert;cooperative;agree to therapy  -ORESTES     Patient/Family Observations  family supportive  -ORESTES     Prior Level of Function  independent:;gait;transfer;bed mobility;ADL's  -ORESTES     Equipment Currently Used at Home  none  -ORESTES     Pertinent History of Current Functional Problem  patient has had chest pain for several months followed by (+) [facility] patient is now seen S/P CABG x4 on 1/17  -ORESTES     Existing Precautions/Restrictions  cardiac;oxygen therapy device and L/min;sternal  -ORESTES     Risks Reviewed  patient:;spouse/S.O.:;LOB;increased discomfort;change in vital signs  -ORESTES     Benefits Reviewed  patient:;spouse/S.O.:;improve function;increase strength;decrease risk of DVT  -ORESTES     Barriers to Rehab  none identified  -ORESTES     Row Name 01/18/19 1400          Relationship/Environment    Lives With  spouse  -ORESTES     Row Name 01/18/19 1400          Resource/Environmental Concerns    Current Living Arrangements  home/apartment/condo  -ORESTES     Resource/Environmental Concerns  home accessibility  -ORESTES     Home Accessibility Concerns  stairs to access bedroom or bathroom;stairs to enter home  -ORESTES     Row Name 01/18/19 1400          Cognitive Assessment/Intervention- PT/OT    Orientation Status (Cognition)  oriented x 4  -ORESTES     Follows Commands (Cognition)  WFL  -ORESTES     Safety Deficit (Cognitive)  safety precautions awareness;safety precautions follow-through/compliance  -ORESTES     Row Name 01/18/19 1400          Safety Issues, Functional Mobility    Safety Issues Affecting Function (Mobility)  safety precautions follow-through/compliance;safety precaution awareness  -ORESTES     Impairments Affecting Function (Mobility)  balance;endurance/activity tolerance;pain;shortness of breath;strength  -ORESTES     Row Name 01/18/19 1400          Bed Mobility Assessment/Treatment    Bed Mobility Assessment/Treatment  rolling left;rolling right;scooting/bridging;sit-supine  -ORESTES     Rolling  Left [place] (Bed Mobility)  minimum assist (75% patient effort)  -ORESTES     Rolling Right [place] (Bed Mobility)  minimum assist (75% patient effort)  -ORESTES     Scooting/Bridging [place] (Bed Mobility)  minimum assist (75% patient effort)  -ORESTES     Sit-Supine [place] (Bed Mobility)  moderate assist (50% patient effort);2 person assist  -ORESTES     Bed Mobility, Safety Issues  decreased use of arms for pushing/pulling;decreased use of legs for bridging/pushing  -ORESTES     Row Name 01/18/19 1400          Transfer Assessment/Treatment    Transfer Assessment/Treatment  sit-stand transfer;stand-sit transfer  -ORESTES     Sit-Stand [place] (Transfers)  minimum assist (75% patient effort)  -ORESTES     Stand-Sit [place] (Transfers)  minimum assist (75% patient effort)  -ORESTES     Row Name 01/18/19 1400          Gait/Stairs Assessment/Training    Gait/Stairs Assessment/Training  gait/ambulation independence  -ORESTES     [place] Level (Gait)  moderate assist (50% patient effort)  -ORESTES     Distance in Feet (Gait)  75  -ORESTES     Pattern (Gait)  step-through  -ORESTES     Comment (Gait/Stairs)  patient required several short standing rests due to easily fatigued  -ORESTES     Row Name 01/18/19 1400          General ROM    GENERAL ROM COMMENTS  no ROM deficits  -ORESTES     Row Name 01/18/19 1400          MMT (Manual Muscle Testing)    General MMT Comments  generalized weakness 4/5  -ORESTES     Row Name 01/18/19 1400          Pain Assessment    Additional Documentation  Pain Scale: Numbers Pre/Post-Treatment (Group)  -ORESTES     Row Name 01/18/19 1400          Pain Scale: Numbers Pre/Post-Treatment    Pain Scale: Numbers, Pretreatment  4/10  -ORESTES     Pain Scale: Numbers, Post-Treatment  5/10  -ORESTES     Pain Location - Orientation  incisional  -ORESTES     Pain Location  chest  -ORESTES     Pre/Post Treatment Pain Comment  eases with rest  -ORESTES     Pain Intervention(s)  Repositioned;Ambulation/increased activity;Splinting;Rest  -ORESTES     Row Name             Wound  01/17/19 0754 chest incision    Wound - Properties Group Date first assessed: 01/17/19  -JOVI Time first assessed: 0754  -JOVI Location: chest  -JOVI Type: incision  -JOVI    Row Name             Wound 01/17/19 0754 Right leg incision    Wound - Properties Group Date first assessed: 01/17/19  -JOVI Time first assessed: 0754  -JOVI Side: Right  -JOVI Location: leg  -JOVI Type: incision  -JOVI    Row Name 01/18/19 1400          Physical Therapy Clinical Impression    Date of Referral to PT  01/18/19  -ORESTES     PT Diagnosis (PT Clinical Impression)  -- impaired bed mobility transfer and gait decreased strength   -ORESTES     Patient/Family Goals Statement (PT Clinical Impression)  patient to go home with family assist  -ORESTES     Criteria for Skilled Interventions Met (PT Clinical Impression)  yes;treatment indicated  -ORESTES     Rehab Potential (PT Clinical Summary)  good, to achieve stated therapy goals  -ORESTES     Care Plan Review (PT)  evaluation/treatment results reviewed;care plan/treatment goals reviewed;risks/benefits reviewed;patient/other agree to care plan  -ORESTES     Care Plan Review, Other Participant (PT Clinical Impression)  spouse  -ORESTES     Row Name 01/18/19 1400          Vital Signs    Pre Systolic BP Rehab  110  -ORESTES     Pre Treatment Diastolic BP  50  -ORESTES     Post Systolic BP Rehab  104  -ORESTES     Post Treatment Diastolic BP  44  -ORESTES     Pretreatment Heart Rate (beats/min)  74  -ORESTES     Posttreatment Heart Rate (beats/min)  80  -ORESTES     Pre SpO2 (%)  95  -ORESTES     O2 Delivery Pre Treatment  nasal cannula  -ORESTES     Post SpO2 (%)  95  -ORESTES     O2 Delivery Post Treatment  supplemental O2  -ORESTES     Pre Patient Position  Sitting  -ORESTES     Intra Patient Position  Standing  -ORESTES     Post Patient Position  Supine  -ORESTES     Row Name 01/18/19 1400          Physical Therapy Goals    Bed Mobility Goal Selection (PT)  bed mobility, PT goal 1  -ORESTES     Transfer Goal Selection (PT)  transfer, PT goal 1  -ORESTES     Gait Training Goal Selection (PT)  gait training, PT  goal 1  -ORESTES     Stairs Goal Selection (PT)  stairs, PT goal 1  -ORESTES     Additional Documentation  Stairs Goal Selection (PT) (Row)  -ORESTES     Row Name 01/18/19 1400          Bed Mobility Goal 1 (PT)    Activity/Assistive Device (Bed Mobility Goal 1, PT)  sit to supine/supine to sit  -ORESTES     [place] Level/Cues Needed (Bed Mobility Goal 1, PT)  independent  -ORESTES     Time Frame (Bed Mobility Goal 1, PT)  long term goal (LTG);10 days  -ORESTES     Progress/Outcomes (Bed Mobility Goal 1, PT)  goal ongoing  -ORESTES     Row Name 01/18/19 1400          Transfer Goal 1 (PT)    Activity/Assistive Device (Transfer Goal 1, PT)  sit-to-stand/stand-to-sit  -ORESTES     [place] Level/Cues Needed (Transfer Goal 1, PT)  independent  -ORESTES     Time Frame (Transfer Goal 1, PT)  long term goal (LTG);10 days  -ORESTES     Progress/Outcome (Transfer Goal 1, PT)  goal ongoing  -ORESTES     Row Name 01/18/19 1400          Gait Training Goal 1 (PT)    Activity/Assistive Device (Gait Training Goal 1, PT)  gait (walking locomotion)  -ORESTES     [place] Level (Gait Training Goal 1, PT)  independent  -ORESTES     Distance (Gait Goal 1, PT)  400  -ORESTES     Time Frame (Gait Training Goal 1, PT)  long term goal (LTG);10 days  -ORESTES     Progress/Outcome (Gait Training Goal 1, PT)  goal ongoing  -ORESTES     Row Name 01/18/19 1400          Stairs Goal 1 (PT)    Activity/Assistive Device (Stairs Goal 1, PT)  ascending stairs;descending stairs  -ORESTES     [place] Level/Cues Needed (Stairs Goal 1, PT)  independent  -ORESTES     Number of Stairs (Stairs Goal 1, PT)  10  -ORESTES     Time Frame (Stairs Goal 1, PT)  long term goal (LTG);10 days  -ORESTES     Progress/Outcome (Stairs Goal 1, PT)  goal ongoing  -ORESTES     Row Name 01/18/19 1400          Patient Education Goal (PT)    Activity (Patient Education Goal, PT)  HEP  -ORESTES     [place]/Cues/Accuracy (Memory Goal 2, PT)  verbalizes understanding  -ORESTES     Time Frame (Patient Education Goal, PT)  long term goal (LTG);10 days  -ORESTES      Progress/Outcome (Patient Education Goal, PT)  goal ongoing  -     Row Name 01/18/19 1400          Positioning and Restraints    Pre-Treatment Position  sitting in chair/recliner  -ORESTES     Post Treatment Position  bed  -ORESTES     In Bed  notified nsg;supine;call light within reach;with family/caregiver  -ORESTES       User Key  (r) = Recorded By, (t) = Taken By, (c) = Cosigned By    Initials Name Provider Type    Rosalie Levy, PT Physical Therapist    Smitha Tabares RN Registered Nurse        Physical Therapy Education     Title: PT OT SLP Therapies (In Progress)     Topic: Physical Therapy (In Progress)     Point: Mobility training (In Progress)     Learning Progress Summary           Patient Acceptance, E, NR by ORESTES at 1/18/2019  2:00 PM                   Point: Home exercise program (In Progress)     Learning Progress Summary           Patient Acceptance, E, NR by ORESTES at 1/18/2019  2:00 PM                   Point: Body mechanics (In Progress)     Learning Progress Summary           Patient Acceptance, E, NR by ORESTES at 1/18/2019  2:00 PM                   Point: Precautions (In Progress)     Learning Progress Summary           Patient Acceptance, E, NR by ORESTES at 1/18/2019  2:00 PM                               User Key     Initials Effective Dates Name Provider Type Discipline     06/19/15 -  Rosalie Prieto, PT Physical Therapist PT              PT Recommendation and Plan  Anticipated Discharge Disposition (PT): home with assist  Planned Therapy Interventions (PT Eval): balance training, bed mobility training, gait training, home exercise program, strengthening, transfer training  Therapy Frequency (PT Clinical Impression): daily  Outcome Summary/Treatment Plan (PT)  Anticipated Discharge Disposition (PT): home with assist  Outcome Summary: PT eval completed patient is able to ambulate 75 ft with assist of 2 people, patient is limited by nausea and pain. anticipate patient will be able to go home with family  assist upon D/C from [facility]  Outcome Measures     Row Name 01/18/19 1400             How much help from another person do you currently need...    Turning from your back to your side while in flat bed without using bedrails?  3  -ORESTES      Moving from lying on back to sitting on the side of a flat bed without bedrails?  2  -ORESTES      Moving to and from a bed to a chair (including a wheelchair)?  3  -ORESTES      Standing up from a chair using your arms (e.g., wheelchair, bedside chair)?  3  -ORESTES      Climbing 3-5 steps with a railing?  1  -ORESTES      To walk in hospital room?  2  -ORESTES      AM-PAC 6 Clicks Score  14  -ORESTES         Functional Assessment    Outcome Measure Options  AM-PAC 6 Clicks Basic Mobility (PT)  -ORESTES        User Key  (r) = Recorded By, (t) = Taken By, (c) = Cosigned By    Initials Name Provider Type    Rosalie Levy PT Physical Therapist         Time Calculation:   PT Charges     Row Name 01/18/19 1400             Time Calculation    Start Time  1400  -ORESTES      PT Received On  01/18/19  -ORESTES      PT Goal Re-Cert Due Date  01/28/19  -        User Key  (r) = Recorded By, (t) = Taken By, (c) = Cosigned By    Initials Name Provider Type    Rosalie Levy PT Physical Therapist        Therapy Suggested Charges     Code   Minutes Charges    None           Therapy Charges for Today     Code Description Service Date Service Provider Modifiers Qty    [id number] HC PT EVAL HIGH COMPLEXITY 4 1/18/2019 oRsalie Prieto PT GP 1          PT G-Codes  Outcome Measure Options: AM-PAC 6 Clicks Basic Mobility (PT)  AM-PAC 6 Clicks Score: 14      Rosalie Prieto PT  1/18/2019

## 2019-01-18 NOTE — PROGRESS NOTES
Intensivist Note     1/18/2019  Hospital Day: 1  1 Day Post-Op      Mr. Yosef Hardy, 73 y.o. 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 Patient had apparently received chelation therapy for vascular disease.     Patient subsequently presented with a several month history of exercise-induced chest discomfort manifesting as tightness radiating to the neck and jaw. This also occurred after a large meal. He also noted associated dyspnea with this discomfort. Underwent a stress echo was abnormal and was subsequently referred to Dr. Piedra for cardiac catheterization. Catheterization was performed 1/16/19 and revealed critical coronary disease with an occluded RCA and a 90% left main.     Patient underwent uneventful CABG ×4 on 1/17/19. He received 1 unit of PRBC, 2 platelets, and Feiba prior to arriving in the ICU. He did well with ventilator weaning and was easily extubated at 1600. Last evening he developed a period of accelerated junctional rhythm but is now in sinus rhythm in the 60s. QT interval is slightly prolonged. He had some ventricular arrhythmias yesterday but this has resolved since removal of [place]. He remains on low-dose dopamine which he was on upon arriving in the ICU yesterday as well as low-dose norepinephrine. Blood pressure however is quite good now and these will be weaned. He is not a diabetic but remains on an insulin drip with control blood sugars. Should be able to transition today. He is presently on 3 L of nasal O2 with an O2 sat of 95%. He denies any dyspnea and just has the usual amount of postoperative pain. He did set some nausea  "earlier and received some Phenergan causing him to be somewhat sleepy but he is easily arousable and cooperative.    The patient's relevant past medical, surgical and social history were reviewed and updated in Epic as appropriate.    OBJECTIVE     /45   Pulse 71   Temp 97.7 °F (36.5 °C) (Axillary)   Resp 16   Ht 170.2 cm (67\")   Wt 83.9 kg (185 lb)   SpO2 94%   BMI 28.98 kg/m²   Flow (L/min): 5    Flowsheet Rows      First Filed Value   Admission Height  170.2 cm (67\") Documented at 01/16/2019 1200   Admission Weight  84.2 kg (185 lb 10 oz) Documented at 01/16/2019 1200        Intake & Output (last day)       01/17 0701 - 01/18 0700 01/18 0701 - 01/19 0700    P.O. 400 140    I.V. (mL/kg) 2926 (34.9) 264 (3.1)    Blood 1017     IV Piggyback 2000 100    Total Intake(mL/kg) 6343 (75.6) 504 (6)    Urine (mL/kg/hr) 3495 (1.7) 435 (0.5)    Blood 800     Chest Tube 1350 260    Total Output 5645 695    Net +698 -191                Exam:  General Exam:  Well-developed older white male sitting up in a chair in NAD  HEENT: Pupils equal and reactive. Nose and throat clear.  Neck:                          Supple, no JVD, thyromegaly, or adenopathy. Right IJ [place] in place  Lungs: Shallow respirations with diminished breath sounds. Few scattered rales.  Chest:                         4 MT/CT tubes in place.  Cardiovascular: RRR without murmurs or gallops. Soft pericardial rub. HR 65 BPM  Abdomen: Soft nontender without organomegaly or masses.   and rectal: Castillo catheter in place  Extremities: No cyanosis clubbing edema. Good bilateral posterior tibial and dorsalis pedis pulses  Neurologic:                 Symmetric strength. No focal deficits. Sleepy but arousable and cooperative    Chest X-Ray: Heart size is normal and lungs are clear except some mild bibasilar atelectasis. The area superior and medial to the aortic knob have a new area of fullness suggesting possible blood in the superior " mediastinum.    INFUSIONS  amiodarone 0.5 mg/min Last Rate: Stopped (01/18/19 0011)   DOPamine 2-20 mcg/kg/min Last Rate: 3 mcg/kg/min (01/18/19 0400)   norepinephrine 0.02-0.3 mcg/kg/min Last Rate: 0.06 mcg/kg/min (01/18/19 1425)       Results from last 7 days   Lab Units 01/18/19  0352 01/17/19  2200 01/17/19  1459   WBC 10*3/mm3 12.15* 16.66* 8.38   HEMOGLOBIN g/dL 9.0* 10.1* 8.0*   HEMATOCRIT % 27.1* 29.4* 23.4*   PLATELETS 10*3/mm3 194 228 123*     Results from last 7 days   Lab Units 01/18/19  0352 01/17/19  2200   SODIUM mmol/L 139 140   POTASSIUM mmol/L 4.2 3.9   CHLORIDE mmol/L 109 108   CO2 mmol/L 22.0 23.0   BUN mg/dL 20 19   CREATININE mg/dL 1.84* 1.69*   GLUCOSE mg/dL 116* 123*   CALCIUM mg/dL 8.3* 8.8     Results from last 7 days   Lab Units 01/18/19  0352 01/17/19  1459 01/17/19  1146   MAGNESIUM mg/dL 2.7 2.8* 3.7*   PHOSPHORUS mg/dL 3.8 3.1 2.7           No results found for: SEDRATE  No results found for: BNP  No results found for: CKTOTAL, CKMB, CKMBINDEX, TROPONINI, TROPONINT  No results found for: TSH  No results found for: LACTATE  No results found for: CORTISOL    Results from last 7 days   Lab Units 01/17/19  1551 01/17/19  1140 01/17/19  1051 01/17/19  1031 01/17/19  0954   PH, ARTERIAL pH units 7.333* 7.411 7.48 7.52 7.39   PCO2, ARTERIAL mm Hg 49.7 39.7  --   --   --    PO2 ART mm Hg 99.4 311.0*  --   --   --    HCO3 ART mmol/L 26.4* 25.2  --   --   --    FIO2 % 40 100  --   --   --          I reviewed the patient's results, images and medication.    Assessment/Plan   ASSESSMENT        Severe CAD with occluded RCA and 90% left main    Unstable angina (CMS/HCC)    S/P CABG x 4 on 1/17/19    Hypertension    Hyperlipidemia    Hypothyroidism on replacement      DISCUSSION: Appears to be doing well and should be able to come off his pressors. I am somewhat concerned about the area above his aortic knob is suspected be an area of venous bleeding.    PLAN     1. Repeat chest x-ray and if any  concern I will obtain a CT scan of the chest  2. Wean off dopamine and norepinephrine  3. Leave Castillo in until tomorrow  4. Continue ulcer prophylaxis and DVT prophylaxis with SCD's    Plan of care and goals reviewed with mulitdisciplinary team at daily rounds.    I discussed the patient's findings and my recommendations with patient, nursing staff and consulting provider    Time spent Critical care 35 min (It does not include procedure time).    Yosef Donahue MD  Intensive Care Medicine  01/18/19 4:49 PM     Addendum: I just reviewed a repeat chest x-ray in the area in question superior to the aortic knob looks slightly less prominent. The patient has no complaints, has a stable hematocrit, and has excellent pulses in his feet. I plan on just following up chest x-ray again in the morning. Situation discussed with Dr. Coppola and we are in agreement to continue to follow.    Yosef Donahue MD  Pulmonary and Critical Care Medicine  01/18/19 5:27 PM

## 2019-01-18 NOTE — PLAN OF CARE
Problem: Patient Care Overview  Goal: Plan of Care Review  Outcome: Ongoing (interventions implemented as appropriate)   01/18/19 0700   Coping/Psychosocial   Plan of Care Reviewed With patient   Plan of Care Review   Progress improving   OTHER   Outcome Summary drips changed and decreased throughout the night remains on back up pacerf       Problem: Cardiac Surgery (Adult)  Goal: Signs and Symptoms of Listed Potential Problems Will be Absent, Minimized or Managed (Cardiac Surgery)  Outcome: Ongoing (interventions implemented as appropriate)   01/18/19 0600   Goal/Outcome Evaluation   Problems Assessed (Cardiac Surgery) all   Problems Present (Cardiac Surgery) hemodynamic instability;pain;postoperative nausea and vomiting;situational response       Problem: Fall Risk (Adult)  Goal: Identify Related Risk Factors and Signs and Symptoms  Outcome: Ongoing (interventions implemented as appropriate)   01/18/19 0600   Fall Risk (Adult)   Related Risk Factors (Fall Risk) gait/mobility problems;culprit medication(s);homeostatic imbalance;environment unfamiliar   Signs and Symptoms (Fall Risk) presence of risk factors     Goal: Absence of Fall  Outcome: Ongoing (interventions implemented as appropriate)      Problem: Skin Injury Risk (Adult)  Goal: Identify Related Risk Factors and Signs and Symptoms  Outcome: Ongoing (interventions implemented as appropriate)   01/18/19 0752   Skin Injury Risk (Adult)   Related Risk Factors (Skin Injury Risk) critical care admission;mobility impaired;tissue perfusion altered     Goal: Skin Health and Integrity  Outcome: Ongoing (interventions implemented as appropriate)   01/18/19 0600   Skin Injury Risk (Adult)   Skin Health and Integrity making progress toward outcome

## 2019-01-18 NOTE — PROGRESS NOTES
Clinical Nutrition     Multidisciplinary Rounds    Time: 20min  Patient Name: Yosef Hardy  Date of Encounter: 01/18/19 9:44 AM  MRN: [id number]  Admission date: 1/16/2019      Reason for visit: MDR. RD to continue to follow per protocol.     Additional information obtained during MDR:  S/P CABG x 4 (1/17) with h/o HTN, HLP.  Drowsy this morning, still requiring pressors support.  GTT: Dopamine @ 3mcg/kg/min, insulin 2.4units/hr, Levo 0.06mcg/kg/min     Current diet: Diet Clear Liquid; Cardiac, Consistent Carbohydrate  No active supplement orders      EMR reviewed     Intervention:  Follow treatment plan  Care plan reviewed  Advance diet as tolerated     Follow up:   Per protocol      Katie Mejia RD  9:44 AM

## 2019-01-18 NOTE — PLAN OF CARE
Problem: Patient Care Overview  Goal: Plan of Care Review  Outcome: Ongoing (interventions implemented as appropriate)   01/18/19 1400   Coping/Psychosocial   Plan of Care Reviewed With patient;spouse  (Simultaneous filing. User may be unaware of other data.)   OTHER   Outcome Summary PT eval completed patient is able to ambulate 75 ft with assist of 2 people, patient is limited by nausea and pain. anticipate patient will be able to go home with family assist upon D/C from [facility]

## 2019-01-18 NOTE — PROGRESS NOTES
Order received for Phase II Cardiac Rehab. Patient is POD #1 today. Staff will follow up once patient is transferred to telemetry.

## 2019-01-18 NOTE — PROGRESS NOTES
Discharge Planning Assessment  [facility]     Patient Name: Yosef Hardy  MRN: [id number]  Today's Date: 1/18/2019    Admit Date: 1/16/2019    Discharge Needs Assessment     Row Name 01/18/19 1328       Living Environment    Lives With  spouse    Name(s) of Who Lives With Patient  Spouse:  Luisa Kennedy, # [phone]; cell# [phone]    Current Living Arrangements  home/apartment/condo    Primary Care Provided by  self    Provides Primary Care For  no one    Family Caregiver if Needed  none;spouse    Quality of Family Relationships  helpful;involved;supportive    Able to Return to Prior Arrangements  yes    Living Arrangement Comments  Resides in private residence with spouse       Resource/Environmental Concerns    Resource/Environmental Concerns  none    Transportation Concerns  car, none       Transition Planning    Patient/Family Anticipates Transition to  home with family    Patient/Family Anticipated Services at Transition      Transportation Anticipated  family or friend will provide       Discharge Needs Assessment    Readmission Within the Last 30 Days  no previous admission in last 30 days    Concerns to be Addressed  discharge planning    Concerns Comments  Requesting referral to home health at discharge    Equipment Currently Used at Home  none    Anticipated Changes Related to Illness  none    Equipment Needed After Discharge  none    Outpatient/Agency/Support Group Needs  homecare agency    Offered/Gave Vendor List  no    Discharge Coordination/Progress  Planning to return home with spouse and home health services at discharge        Discharge Plan     Row Name 01/18/19 7467       Plan    Plan Home with spouse    Patient/Family in Agreement with Plan  -- Patient/spouse    Plan Comments Remains in ICU s/p CABG x 4 on 1/17/19.  Met with patient/spouse at bedside, report discharge plan is to return home.  Unsure if they will need home health services at this time, Case Management will  follow closely and assist as needed.    Final Discharge Disposition Code 01 - home or self-care;06 - home with home health care        Destination      No service coordination in this encounter.      Durable Medical Equipment      No service coordination in this encounter.      Dialysis/Infusion      No service coordination in this encounter.      Home Medical Care      No service coordination in this encounter.      Community Resources      No service coordination in this encounter.          Demographic Summary     Row Name 01/18/19 1321       General Information    Admission Type  inpatient    Arrived From  home    Referral Source  admission list;interdisciplinary rounds    Reason for Consult  discharge planning    Preferred Language  English     Used During This Interaction  no        Functional Status    No documentation.       Psychosocial    No documentation.       Abuse/Neglect    No documentation.       Legal    No documentation.       Substance Abuse    No documentation.       Patient Forms    No documentation.           JUAN Alarcon

## 2019-01-18 NOTE — PLAN OF CARE
Problem: Patient Care Overview  Goal: Plan of Care Review  Outcome: Ongoing (interventions implemented as appropriate)   01/18/19 1525   Coping/Psychosocial   Plan of Care Reviewed With patient;family   Plan of Care Review   Progress improving   OTHER   Outcome Summary pt remains in icu slowly improving. able to walk with PT today. remains on O2. chest tubes moderate output. pulm tolieting as tolerated. Pt in NSR with V-wires attached to back up rate of 50. pt on dopa and levo, weaning as tolerated. current main complain is post op nausea. phenegran and zofran given throughout the day, some relief. f/c with adequate output. pain within tolerable limits. achs.        Problem: Cardiac Surgery (Adult)  Goal: Signs and Symptoms of Listed Potential Problems Will be Absent, Minimized or Managed (Cardiac Surgery)  Outcome: Ongoing (interventions implemented as appropriate)   01/18/19 1525   Goal/Outcome Evaluation   Problems Assessed (Cardiac Surgery) all   Problems Present (Cardiac Surgery) hemodynamic instability;pain;postoperative nausea and vomiting;situational response       Problem: Fall Risk (Adult)  Goal: Identify Related Risk Factors and Signs and Symptoms  Outcome: Ongoing (interventions implemented as appropriate)   01/18/19 1525   Fall Risk (Adult)   Related Risk Factors (Fall Risk) fatigue/slow reaction;inadequate lighting;polypharmacy;environment unfamiliar   Signs and Symptoms (Fall Risk) presence of risk factors       Problem: Skin Injury Risk (Adult)  Goal: Identify Related Risk Factors and Signs and Symptoms  Outcome: Ongoing (interventions implemented as appropriate)   01/18/19 1525   Skin Injury Risk (Adult)   Related Risk Factors (Skin Injury Risk) critical care admission;mobility impaired     Goal: Skin Health and Integrity  Outcome: Ongoing (interventions implemented as appropriate)   01/18/19 1525   Skin Injury Risk (Adult)   Skin Health and Integrity making progress toward outcome

## 2019-01-18 NOTE — PROGRESS NOTES
CTS Progress Note       LOS: 1 day   Patient Care Team:  Mario Bashir MD as PCP - General (Internal Medicine)    Chief Complaint: Coronary artery disease    Vital Signs:  Temp:  [95.9 °F (35.5 °C)-101.7 °F (38.7 °C)] 99.3 °F (37.4 °C)  Heart Rate:  [] 65  Resp:  [12-20] 18  BP: ()/() 106/47  Arterial Line BP: ()/(1-54) 100/48  FiO2 (%):  [40 %-100 %] 40 %    Physical Exam: Extubated.  Up in chair.  No neurologic deficits.  Sternum is stable   Results:   Results from last 7 days   Lab Units 01/18/19  0352   WBC 10*3/mm3 12.15*   HEMOGLOBIN g/dL 9.0*   HEMATOCRIT % 27.1*   PLATELETS 10*3/mm3 194     Results from last 7 days   Lab Units 01/18/19  0352   SODIUM mmol/L 139   POTASSIUM mmol/L 4.2   CHLORIDE mmol/L 109   CO2 mmol/L 22.0   BUN mg/dL 20   CREATININE mg/dL 1.84*   GLUCOSE mg/dL 116*   CALCIUM mg/dL 8.3*           Imaging Results (last 24 hours)     Procedure Component Value Units Date/Time    XR Chest 1 View [[id number]] Updated:  01/18/19 0410    XR Chest 1 View [[id number]] Collected:  01/17/19 1206     Updated:  01/17/19 1247    Narrative:       EXAMINATION: XR CHEST 1 VW- 01/17/2019     INDICATION: Post-Op Check Line & Tube Placement; I20.0-Unstable angina     TECHNIQUE:  Single view frontal chest.     COMPARISONS: 5 hours prior     FINDINGS:  Endotracheal tubes and NG tube are now in place. Right IJ  approach [place] catheter terminates at midline. New sternotomy wires  and surgical clips. New chest drains. Basal subsegmental atelectasis. No  pleural effusion or pneumothorax.       Impression:       Expected postoperative appearance.     D:  01/17/2019  E:  01/17/2019     This report was finalized on 1/17/2019 12:44 PM by Otis Rose.       XR Chest 1 View [[id number]] Collected:  01/17/19 1025     Updated:  01/17/19 1106    Narrative:       EXAMINATION: XR CHEST 1 VW-      INDICATION: Surgery; I20.0-Unstable angina.     TECHNIQUE:  Single view frontal chest.      COMPARISONS: None.     FINDINGS:  Lungs are without focal abnormality. No pleural effusion or  pneumothorax. Cardiomediastinal silhouette is within normal limits.       Impression:       No acute abnormality.     D:  01/17/2019  E:  01/17/2019     This report was finalized on 1/17/2019 11:03 AM by Otis Rose.             Assessment      Severe CAD with occluded RCA and 90% left main    Unstable angina (CMS/HCC)    S/P CABG x 4 on 1/17/19    Hypertension    Hyperlipidemia    Hypothyroidism on replacement    Still on some low-dose pressors.    Plan   Continuing supportive care    Please note that portions of this note were completed with a voice recognition program. Efforts were made to edit the dictations, but occasionally words are mistranscribed.    Gato Coppola MD  01/18/19  6:31 AM

## 2019-01-18 NOTE — PROGRESS NOTES
[facility]       LOS: 1 day   Patient Care Team:  Mario Bashir MD as PCP - General (Internal Medicine)        Subjective       Patient Denies:  Sob, palpitations.  Sore.  Up in chair      Vital Signs  Temp:  [95.9 °F (35.5 °C)-101.7 °F (38.7 °C)] 99.3 °F (37.4 °C)  Heart Rate:  [] 65  Resp:  [12-20] 18  BP: ()/() 106/47  Arterial Line BP: ()/(1-54) 100/48  FiO2 (%):  [40 %-100 %] 40 %    Intake/Output Summary (Last 24 hours) at 1/18/2019 0806  Last data filed at 1/18/2019 0600  Gross per 24 hour   Intake 6343 ml   Output 5645 ml   Net 698 ml     No intake/output data recorded.    Physical Exam:     General Appearance:    A&O, in no acute distress       Neck:   No adenopathy, supple, trachea midline, no thyromegaly, no JVD       Lungs:     Clear to auscultation anteriorly,respirations regular, even and                  unlabored    Heart:    Regular rhythm and normal rate, normal S1 and S2, no            murmur, no gallop, no rub, no click   Chest Wall:    No abnormalities observed   Abdomen:     Normal bowel sounds, no masses, no organomegaly, soft               Extremities:   No edema, no cyanosis, no redness   Pulses:   Pulses palpable and equal bilaterally     Results Review:     I reviewed the patient's new clinical results.      WBC WBC   Date/Time Value Ref Range Status   01/18/2019 0352 12.15 (H) 3.50 - 10.80 10*3/mm3 Final   01/17/2019 2200 16.66 (H) 3.50 - 10.80 10*3/mm3 Final   01/17/2019 1459 8.38 3.50 - 10.80 10*3/mm3 Final   01/17/2019 1146 9.37 3.50 - 10.80 10*3/mm3 Final   01/16/2019 1203 5.98 3.50 - 10.80 10*3/mm3 Final            HGB Hemoglobin   Date/Time Value Ref Range Status   01/18/2019 0352 9.0 (L) 13.1 - 17.5 g/dL Final   01/17/2019 2200 10.1 (L) 13.1 - 17.5 g/dL Final   01/17/2019 1459 8.0 (L) 13.1 - 17.5 g/dL Final   01/17/2019 1146 9.4 (L) 13.1 - 17.5 g/dL Final   01/17/2019 1051 8.8 (L) 12.0 -  17.0 g/dL Final   01/17/2019 1031 8.8 (L) 12.0 - 17.0 g/dL Final   01/17/2019 0954 8.8 (L) 12.0 - 17.0 g/dL Final   01/17/2019 0926 9.2 (L) 12.0 - 17.0 g/dL Final   01/17/2019 0902 8.8 (L) 12.0 - 17.0 g/dL Final   01/17/2019 0821 12.9 12.0 - 17.0 g/dL Final   01/17/2019 0710 14.6 12.0 - 17.0 g/dL Final   01/16/2019 1203 14.6 13.1 - 17.5 g/dL Final           HCT Hematocrit   Date/Time Value Ref Range Status   01/18/2019 0352 27.1 (L) 38.9 - 50.9 % Final   01/17/2019 2200 29.4 (L) 38.9 - 50.9 % Final   01/17/2019 1459 23.4 (L) 38.9 - 50.9 % Final   01/17/2019 1146 27.2 (L) 38.9 - 50.9 % Final   01/17/2019 1051 26 (L) 38 - 51 % Final   01/17/2019 1031 26 (L) 38 - 51 % Final   01/17/2019 0954 26 (L) 38 - 51 % Final   01/17/2019 0926 27 (L) 38 - 51 % Final   01/17/2019 0902 26 (L) 38 - 51 % Final   01/17/2019 0821 38 38 - 51 % Final   01/17/2019 0710 43 38 - 51 % Final   01/16/2019 1203 42.2 38.9 - 50.9 % Final            Platlets Platelets   Date/Time Value Ref Range Status   01/18/2019 0352 194 150 - 450 10*3/mm3 Final   01/17/2019 2200 228 150 - 450 10*3/mm3 Final   01/17/2019 1459 123 (L) 150 - 450 10*3/mm3 Final   01/17/2019 1146 151 150 - 450 10*3/mm3 Final   01/16/2019 1203 175 150 - 450 10*3/mm3 Final     Sodium  Sodium   Date/Time Value Ref Range Status   01/18/2019 0352 139 132 - 146 mmol/L Final   01/17/2019 2200 140 132 - 146 mmol/L Final   01/17/2019 1459 142 132 - 146 mmol/L Final   01/17/2019 1146 140 132 - 146 mmol/L Final   01/16/2019 1203 139 132 - 146 mmol/L Final     Potassium  Potassium   Date/Time Value Ref Range Status   01/18/2019 0352 4.2 3.5 - 5.5 mmol/L Final   01/17/2019 2200 3.9 3.5 - 5.5 mmol/L Final   01/17/2019 1459 3.6 3.5 - 5.5 mmol/L Final   01/17/2019 1146 4.2 3.5 - 5.5 mmol/L Final   01/16/2019 1203 4.0 3.5 - 5.5 mmol/L Final     Chloride  Chloride   Date/Time Value Ref Range Status   01/18/2019 0352 109 99 - 109 mmol/L Final   01/17/2019 2200 108 99 - 109 mmol/L Final   01/17/2019  1459 112 (H) 99 - 109 mmol/L Final   01/17/2019 1146 107 99 - 109 mmol/L Final   01/16/2019 1203 106 99 - 109 mmol/L Final     BicarbonateNo results found for: PLASMABICARB    BUN BUN   Date/Time Value Ref Range Status   01/18/2019 0352 20 9 - 23 mg/dL Final   01/17/2019 2200 19 9 - 23 mg/dL Final   01/17/2019 1459 15 9 - 23 mg/dL Final   01/17/2019 1146 11 9 - 23 mg/dL Final   01/16/2019 1203 16 9 - 23 mg/dL Final      Creatinine Creatinine   Date/Time Value Ref Range Status   01/18/2019 0352 1.84 (H) 0.60 - 1.30 mg/dL Final   01/17/2019 2200 1.69 (H) 0.60 - 1.30 mg/dL Final   01/17/2019 1459 1.14 0.60 - 1.30 mg/dL Final   01/17/2019 1146 1.06 0.60 - 1.30 mg/dL Final   01/16/2019 1203 0.93 0.60 - 1.30 mg/dL Final      Calcium Calcium   Date/Time Value Ref Range Status   01/18/2019 0352 8.3 (L) 8.7 - 10.4 mg/dL Final   01/17/2019 2200 8.8 8.7 - 10.4 mg/dL Final   01/17/2019 1459 7.9 (L) 8.7 - 10.4 mg/dL Final   01/17/2019 1146 9.6 8.7 - 10.4 mg/dL Final   01/16/2019 1203 9.5 8.7 - 10.4 mg/dL Final      Mag @RESULFAST(MG:3)@        PT/INR:       Lab Results   Component Value Date    PROTIME 16.0 (H) 01/18/2019    PROTIME 15.3 (H) 01/17/2019    PROTIME 13.2 01/17/2019    INR 1.34 (H) 01/18/2019    INR 1.27 (H) 01/17/2019    INR 1.05 01/17/2019      Troponin I:  No results found for: TROPONINI No results found for: CKTOTAL, CKMB, CKMBINDEX, POCRTROPI, TROPONINT      amiodarone 150 mg Intravenous Once   aspirin 325 mg Oral Daily   atorvastatin 40 mg Oral Nightly   cefuroxime 1.5 g Intravenous Q8H   famotidine 20 mg Intravenous Q12H   metoprolol tartrate 12.5 mg Oral Q12H   metoprolol tartrate 2.5 mg Intravenous Q6H   ondansetron 4 mg Intravenous Once   pharmacy consult - MTM  Does not apply Daily   sennosides-docusate sodium 2 tablet Oral BID       amiodarone 0.5 mg/min Last Rate: Stopped (01/18/19 0011)   dexmedetomidine 0.2-1.5 mcg/kg/hr Last Rate: Stopped (01/17/19 1600)   dextrose 5 % with KCl 20 mEq 30 mL/hr Last  Rate: 30 mL/hr (01/17/19 1130)   DOBUTamine 2-20 mcg/kg/min    DOPamine 2-20 mcg/kg/min Last Rate: 3 mcg/kg/min (01/18/19 0400)   EPINEPHrine 0.02-0.3 mcg/kg/min    insulin regular infusion 1 unit/mL (CCU use) 0-50 Units/hr Last Rate: 2.4 Units/hr (01/17/19 8334)   niCARdipine 5-15 mg/hr    nitroglycerin 5-200 mcg/min    norepinephrine 0.02-0.3 mcg/kg/min Last Rate: 0.13 mcg/kg/min (01/18/19 2889)   phenylephrine 0.5-3 mcg/kg/min Last Rate: Stopped (01/18/19 0200)   vasopressin 0.02-0.1 Units/min        Assessment/Plan     Patient Active Problem List   Diagnosis Code   • Unstable angina (CMS/[facility]) I20.0   • Severe CAD with occluded RCA and 90% left main I25.10   • S/P CABG x 4 on 1/17/19 Z95.1   • Hypertension I10   • Hyperlipidemia E78.5   • Hypothyroidism on replacement E07.9     Hemodynamically stable early post 4 vessel CABG  Normal EF  Wean pressors  Watch GFR    OSCAR Amador  01/18/19  8:06 AM

## 2019-01-19 ENCOUNTER — APPOINTMENT (OUTPATIENT)
Dept: GENERAL RADIOLOGY | Facility: HOSPITAL | Age: 74
End: 2019-01-19

## 2019-01-19 LAB
ANION GAP SERPL CALCULATED.3IONS-SCNC: 8 MMOL/L (ref 3–11)
BUN BLD-MCNC: 25 MG/DL (ref 9–23)
BUN/CREAT SERPL: 20 (ref 7–25)
CALCIUM SPEC-SCNC: 8.3 MG/DL (ref 8.7–10.4)
CHLORIDE SERPL-SCNC: 109 MMOL/L (ref 99–109)
CO2 SERPL-SCNC: 22 MMOL/L (ref 20–31)
CREAT BLD-MCNC: 1.25 MG/DL (ref 0.6–1.3)
DEPRECATED RDW RBC AUTO: 45.6 FL (ref 37–54)
ERYTHROCYTE [DISTWIDTH] IN BLOOD BY AUTOMATED COUNT: 13.6 % (ref 11.3–14.5)
GFR SERPL CREATININE-BSD FRML MDRD: 56 ML/MIN/1.73
GLUCOSE BLD-MCNC: 122 MG/DL (ref 70–100)
GLUCOSE BLDC GLUCOMTR-MCNC: 114 MG/DL (ref 70–130)
GLUCOSE BLDC GLUCOMTR-MCNC: 116 MG/DL (ref 70–130)
GLUCOSE BLDC GLUCOMTR-MCNC: 119 MG/DL (ref 70–130)
GLUCOSE BLDC GLUCOMTR-MCNC: 132 MG/DL (ref 70–130)
HCT VFR BLD AUTO: 26.5 % (ref 38.9–50.9)
HGB BLD-MCNC: 8.9 G/DL (ref 13.1–17.5)
MCH RBC QN AUTO: 30.9 PG (ref 27–31)
MCHC RBC AUTO-ENTMCNC: 33.6 G/DL (ref 32–36)
MCV RBC AUTO: 92 FL (ref 80–99)
PLATELET # BLD AUTO: 152 10*3/MM3 (ref 150–450)
PMV BLD AUTO: 10.7 FL (ref 6–12)
POTASSIUM BLD-SCNC: 4 MMOL/L (ref 3.5–5.5)
RBC # BLD AUTO: 2.88 10*6/MM3 (ref 4.2–5.76)
SODIUM BLD-SCNC: 139 MMOL/L (ref 132–146)
WBC NRBC COR # BLD: 11.04 10*3/MM3 (ref 3.5–10.8)

## 2019-01-19 PROCEDURE — 85027 COMPLETE CBC AUTOMATED: CPT | Performed by: PHYSICIAN ASSISTANT

## 2019-01-19 PROCEDURE — 82962 GLUCOSE BLOOD TEST: CPT

## 2019-01-19 PROCEDURE — 97530 THERAPEUTIC ACTIVITIES: CPT

## 2019-01-19 PROCEDURE — 99024 POSTOP FOLLOW-UP VISIT: CPT | Performed by: THORACIC SURGERY (CARDIOTHORACIC VASCULAR SURGERY)

## 2019-01-19 PROCEDURE — 80048 BASIC METABOLIC PNL TOTAL CA: CPT | Performed by: PHYSICIAN ASSISTANT

## 2019-01-19 PROCEDURE — 25010000002 ONDANSETRON PER 1 MG: Performed by: PHYSICIAN ASSISTANT

## 2019-01-19 PROCEDURE — 71045 X-RAY EXAM CHEST 1 VIEW: CPT

## 2019-01-19 PROCEDURE — 93010 ELECTROCARDIOGRAM REPORT: CPT | Performed by: INTERNAL MEDICINE

## 2019-01-19 PROCEDURE — 25010000002 CEFUROXIME: Performed by: PHYSICIAN ASSISTANT

## 2019-01-19 PROCEDURE — 99233 SBSQ HOSP IP/OBS HIGH 50: CPT | Performed by: INTERNAL MEDICINE

## 2019-01-19 PROCEDURE — 93005 ELECTROCARDIOGRAM TRACING: CPT | Performed by: PHYSICIAN ASSISTANT

## 2019-01-19 RX ORDER — LEVOTHYROXINE AND LIOTHYRONINE 19; 4.5 UG/1; UG/1
45 TABLET ORAL
Status: DISCONTINUED | OUTPATIENT
Start: 2019-01-20 | End: 2019-01-23 | Stop reason: HOSPADM

## 2019-01-19 RX ORDER — LEVOTHYROXINE AND LIOTHYRONINE 19; 4.5 UG/1; UG/1
45 TABLET ORAL
Status: DISCONTINUED | OUTPATIENT
Start: 2019-01-19 | End: 2019-01-19

## 2019-01-19 RX ORDER — FAMOTIDINE 20 MG/1
20 TABLET, FILM COATED ORAL 2 TIMES DAILY
Status: DISCONTINUED | OUTPATIENT
Start: 2019-01-19 | End: 2019-01-23 | Stop reason: HOSPADM

## 2019-01-19 RX ADMIN — CEFUROXIME 1.5 G: 1.5 INJECTION, POWDER, FOR SOLUTION INTRAVENOUS at 02:30

## 2019-01-19 RX ADMIN — ATORVASTATIN CALCIUM 40 MG: 40 TABLET, FILM COATED ORAL at 22:07

## 2019-01-19 RX ADMIN — ASPIRIN 325 MG: 325 TABLET, DELAYED RELEASE ORAL at 08:56

## 2019-01-19 RX ADMIN — FAMOTIDINE 20 MG: 10 INJECTION, SOLUTION INTRAVENOUS at 08:56

## 2019-01-19 RX ADMIN — METOPROLOL TARTRATE 12.5 MG: 25 TABLET, FILM COATED ORAL at 22:07

## 2019-01-19 RX ADMIN — ACETAMINOPHEN 650 MG: 325 TABLET ORAL at 13:42

## 2019-01-19 RX ADMIN — FAMOTIDINE 20 MG: 20 TABLET ORAL at 22:06

## 2019-01-19 RX ADMIN — SENNOSIDES AND DOCUSATE SODIUM 2 TABLET: 8.6; 5 TABLET ORAL at 22:07

## 2019-01-19 RX ADMIN — ONDANSETRON 4 MG: 2 INJECTION INTRAMUSCULAR; INTRAVENOUS at 17:00

## 2019-01-19 RX ADMIN — SENNOSIDES AND DOCUSATE SODIUM 2 TABLET: 8.6; 5 TABLET ORAL at 08:56

## 2019-01-19 NOTE — PROGRESS NOTES
[facility]       LOS: 2 days   Patient Care Team:  Mario Bashir MD as PCP - General (Internal Medicine)        Subjective       Patient Denies:  Sob, palpitations.  Sore.  Some nausea.  Up in chair      Vital Signs  Temp:  [97.7 °F (36.5 °C)-99 °F (37.2 °C)] 98 °F (36.7 °C)  Heart Rate:  [59-71] 67  Resp:  [12-20] 16  BP: ()/(39-68) 125/58    Intake/Output Summary (Last 24 hours) at 1/19/2019 1146  Last data filed at 1/19/2019 0800  Gross per 24 hour   Intake 1407 ml   Output 1255 ml   Net 152 ml     I/O this shift:  In: -   Out: 30 [Chest Tube:30]    Physical Exam:     General Appearance:    A&O, in no acute distress       Neck:   No adenopathy, supple, trachea midline, no thyromegaly, no JVD       Lungs:     Clear to auscultation anteriorly,respirations regular, even and                  unlabored    Heart:    Regular rhythm and normal rate, normal S1 and S2, no            murmur, no gallop, no rub, no click   Chest Wall:    No abnormalities observed   Abdomen:     Normal bowel sounds, no masses, no organomegaly, soft               Extremities:   No edema, no cyanosis, no redness   Pulses:   Pulses palpable and equal bilaterally     Results Review:     I reviewed the patient's new clinical results.      WBC WBC   Date/Time Value Ref Range Status   01/19/2019 0405 11.04 (H) 3.50 - 10.80 10*3/mm3 Final   01/18/2019 0352 12.15 (H) 3.50 - 10.80 10*3/mm3 Final   01/17/2019 2200 16.66 (H) 3.50 - 10.80 10*3/mm3 Final   01/17/2019 1459 8.38 3.50 - 10.80 10*3/mm3 Final   01/17/2019 1146 9.37 3.50 - 10.80 10*3/mm3 Final   01/16/2019 1203 5.98 3.50 - 10.80 10*3/mm3 Final            HGB Hemoglobin   Date/Time Value Ref Range Status   01/19/2019 0405 8.9 (L) 13.1 - 17.5 g/dL Final   01/18/2019 2149 9.3 (L) 13.1 - 17.5 g/dL Final   01/18/2019 0352 9.0 (L) 13.1 - 17.5 g/dL Final   01/17/2019 2200 10.1 (L) 13.1 - 17.5 g/dL Final   01/17/2019 1459 8.0  (L) 13.1 - 17.5 g/dL Final   01/17/2019 1146 9.4 (L) 13.1 - 17.5 g/dL Final   01/17/2019 1051 8.8 (L) 12.0 - 17.0 g/dL Final   01/17/2019 1031 8.8 (L) 12.0 - 17.0 g/dL Final   01/17/2019 0954 8.8 (L) 12.0 - 17.0 g/dL Final   01/17/2019 0926 9.2 (L) 12.0 - 17.0 g/dL Final   01/17/2019 0902 8.8 (L) 12.0 - 17.0 g/dL Final   01/17/2019 0821 12.9 12.0 - 17.0 g/dL Final   01/17/2019 0710 14.6 12.0 - 17.0 g/dL Final   01/16/2019 1203 14.6 13.1 - 17.5 g/dL Final           HCT Hematocrit   Date/Time Value Ref Range Status   01/19/2019 0405 26.5 (L) 38.9 - 50.9 % Final   01/18/2019 2149 27.3 (L) 38.9 - 50.9 % Final   01/18/2019 0352 27.1 (L) 38.9 - 50.9 % Final   01/17/2019 2200 29.4 (L) 38.9 - 50.9 % Final   01/17/2019 1459 23.4 (L) 38.9 - 50.9 % Final   01/17/2019 1146 27.2 (L) 38.9 - 50.9 % Final   01/17/2019 1051 26 (L) 38 - 51 % Final   01/17/2019 1031 26 (L) 38 - 51 % Final   01/17/2019 0954 26 (L) 38 - 51 % Final   01/17/2019 0926 27 (L) 38 - 51 % Final   01/17/2019 0902 26 (L) 38 - 51 % Final   01/17/2019 0821 38 38 - 51 % Final   01/17/2019 0710 43 38 - 51 % Final   01/16/2019 1203 42.2 38.9 - 50.9 % Final            Platlets Platelets   Date/Time Value Ref Range Status   01/19/2019 0405 152 150 - 450 10*3/mm3 Final   01/18/2019 0352 194 150 - 450 10*3/mm3 Final   01/17/2019 2200 228 150 - 450 10*3/mm3 Final   01/17/2019 1459 123 (L) 150 - 450 10*3/mm3 Final   01/17/2019 1146 151 150 - 450 10*3/mm3 Final   01/16/2019 1203 175 150 - 450 10*3/mm3 Final     Sodium  Sodium   Date/Time Value Ref Range Status   01/19/2019 0405 139 132 - 146 mmol/L Final   01/18/2019 0352 139 132 - 146 mmol/L Final   01/17/2019 2200 140 132 - 146 mmol/L Final   01/17/2019 1459 142 132 - 146 mmol/L Final   01/17/2019 1146 140 132 - 146 mmol/L Final   01/16/2019 1203 139 132 - 146 mmol/L Final     Potassium  Potassium   Date/Time Value Ref Range Status   01/19/2019 0405 4.0 3.5 - 5.5 mmol/L Final   01/18/2019 0352 4.2 3.5 - 5.5 mmol/L Final    01/17/2019 2200 3.9 3.5 - 5.5 mmol/L Final   01/17/2019 1459 3.6 3.5 - 5.5 mmol/L Final   01/17/2019 1146 4.2 3.5 - 5.5 mmol/L Final   01/16/2019 1203 4.0 3.5 - 5.5 mmol/L Final     Chloride  Chloride   Date/Time Value Ref Range Status   01/19/2019 0405 109 99 - 109 mmol/L Final   01/18/2019 0352 109 99 - 109 mmol/L Final   01/17/2019 2200 108 99 - 109 mmol/L Final   01/17/2019 1459 112 (H) 99 - 109 mmol/L Final   01/17/2019 1146 107 99 - 109 mmol/L Final   01/16/2019 1203 106 99 - 109 mmol/L Final     BicarbonateNo results found for: PLASMABICARB    BUN BUN   Date/Time Value Ref Range Status   01/19/2019 0405 25 (H) 9 - 23 mg/dL Final   01/18/2019 0352 20 9 - 23 mg/dL Final   01/17/2019 2200 19 9 - 23 mg/dL Final   01/17/2019 1459 15 9 - 23 mg/dL Final   01/17/2019 1146 11 9 - 23 mg/dL Final   01/16/2019 1203 16 9 - 23 mg/dL Final      Creatinine Creatinine   Date/Time Value Ref Range Status   01/19/2019 0405 1.25 0.60 - 1.30 mg/dL Final   01/18/2019 0352 1.84 (H) 0.60 - 1.30 mg/dL Final   01/17/2019 2200 1.69 (H) 0.60 - 1.30 mg/dL Final   01/17/2019 1459 1.14 0.60 - 1.30 mg/dL Final   01/17/2019 1146 1.06 0.60 - 1.30 mg/dL Final   01/16/2019 1203 0.93 0.60 - 1.30 mg/dL Final      Calcium Calcium   Date/Time Value Ref Range Status   01/19/2019 0405 8.3 (L) 8.7 - 10.4 mg/dL Final   01/18/2019 0352 8.3 (L) 8.7 - 10.4 mg/dL Final   01/17/2019 2200 8.8 8.7 - 10.4 mg/dL Final   01/17/2019 1459 7.9 (L) 8.7 - 10.4 mg/dL Final   01/17/2019 1146 9.6 8.7 - 10.4 mg/dL Final   01/16/2019 1203 9.5 8.7 - 10.4 mg/dL Final      Mag @RESULFAST(MG:3)@        PT/INR:       Lab Results   Component Value Date    PROTIME 16.0 (H) 01/18/2019    PROTIME 15.3 (H) 01/17/2019    PROTIME 13.2 01/17/2019    INR 1.34 (H) 01/18/2019    INR 1.27 (H) 01/17/2019    INR 1.05 01/17/2019      Troponin I:  No results found for: TROPONINI No results found for: CKTOTAL, CKMB, CKMBINDEX, POCRTROPI, TROPONINT      amiodarone 150 mg Intravenous Once    aspirin 325 mg Oral Daily   atorvastatin 40 mg Oral Nightly   famotidine 20 mg Intravenous Q12H   insulin lispro 0-7 Units Subcutaneous 4x Daily With Meals & Nightly   metoprolol tartrate 12.5 mg Oral Q12H   ondansetron 4 mg Intravenous Once   pharmacy consult - MTM  Does not apply Daily   sennosides-docusate sodium 2 tablet Oral BID       amiodarone 0.5 mg/min Last Rate: Stopped (01/18/19 0011)   dextrose 5 % with KCl 20 mEq 30 mL/hr Last Rate: 30 mL/hr (01/17/19 1130)   DOPamine 2-20 mcg/kg/min Last Rate: Stopped (01/18/19 5366)   niCARdipine 5-15 mg/hr    norepinephrine 0.02-0.3 mcg/kg/min Last Rate: Stopped (01/19/19 0525)       Assessment/Plan     Patient Active Problem List   Diagnosis Code   • Unstable angina (CMS/[facility]) I20.0   • Severe CAD with occluded RCA and 90% left main I25.10   • S/P CABG x 4 on 1/17/19 Z95.1   • Hypertension I10   • Hyperlipidemia E78.5   • Hypothyroidism on replacement E07.9     Hemodynamically stable early post 4 vessel CABG  Normal EF  Watch GFR  Progressing    OSCAR Amador  01/19/19  11:46 AM

## 2019-01-19 NOTE — THERAPY TREATMENT NOTE
Acute Care - Physical Therapy Treatment Note  [facility]     Patient Name: Yosef Hardy  : 1945  MRN: [id number]  Today's Date: 2019  Onset of Illness/Injury or Date of Surgery: 19  Date of Referral to PT: 19  Referring Physician: MD Coppola    Admit Date: 2019    Visit Dx:    ICD-10-CM ICD-9-CM   1. Impaired functional mobility, balance, gait, and endurance Z74.09 V49.89   2. Unstable angina (CMS/HCC) I20.0 411.1     Patient Active Problem List   Diagnosis   • Unstable angina (CMS/HCC)   • Severe CAD with occluded RCA and 90% left main   • S/P CABG x 4 on 19   • Hypertension   • Hyperlipidemia   • Hypothyroidism on replacement       Therapy Treatment    Rehabilitation Treatment Summary     Row Name 19 1023             Treatment Time/Intention    Discipline  physical therapist  (Pended)   -JR      Document Type  therapy note (daily note)  (Pended)   -JR      Subjective Information  complains of;pain  (Pended)  dry/burning eyes  -JR      Mode of Treatment  physical therapy  (Pended)   -JR      Patient/Family Observations  Pt UIC, 2.5L O2NC, nsg in to heplock IV. No family present.  (Pended)   -JR      Care Plan Review  care plan/treatment goals reviewed;patient/other agree to care plan  (Pended)   -JR      Therapy Frequency (PT Clinical Impression)  daily  (Pended)   -JR      Patient Effort  good  (Pended)   -JR      Existing Precautions/Restrictions  cardiac;oxygen therapy device and L/min;fall;sternal  (Pended)   -JR      Recorded by [JR] Marlys Mckeon, PT Student 19 1055      Row Name 19 1023             Vital Signs    Pre Systolic BP Rehab  110  (Pended)   -JR      Pre Treatment Diastolic BP  56  (Pended)   -JR      Post Systolic BP Rehab  150  (Pended)   -JR      Post Treatment Diastolic BP  71  (Pended)   -JR      Pretreatment Heart Rate (beats/min)  67  (Pended)   -JR      Intratreatment Heart Rate (beats/min)  79  (Pended)   -JR      Posttreatment  Heart Rate (beats/min)  71  (Pended)   -JR      Pre SpO2 (%)  95  (Pended)   -JR      O2 Delivery Pre Treatment  supplemental O2  (Pended)  2.5L  -JR      Intra SpO2 (%)  91  (Pended)   -JR      O2 Delivery Intra Treatment  supplemental O2  (Pended)  3L  -JR      Post SpO2 (%)  93  (Pended)   -JR      O2 Delivery Post Treatment  supplemental O2  (Pended)   -JR      Pre Patient Position  Sitting  (Pended)   -JR      Intra Patient Position  Standing  (Pended)   -JR      Post Patient Position  Supine  (Pended)   -JR      Recorded by [JR] Marlys Mckeon, PT Student 01/19/19 1055      Row Name 01/19/19 1023             Cognitive Assessment/Intervention    Additional Documentation  Cognitive Assessment/Intervention (Group)  (Pended)   -JR      Recorded by [JR] Marlys Mckeon, PT Student 01/19/19 1055      Row Name 01/19/19 1023             Cognitive Assessment/Intervention- PT/OT    Affect/Mental Status (Cognitive)  WFL  (Pended)   -JR      Orientation Status (Cognition)  oriented x 4  (Pended)   -JR      Follows Commands (Cognition)  WFL  (Pended)   -JR      Cognitive Function (Cognitive)  safety deficit  (Pended)   -JR      Safety Deficit (Cognitive)  safety precautions awareness;safety precautions follow-through/compliance  (Pended)   -JR      Personal Safety Interventions  gait belt;fall prevention program maintained;nonskid shoes/slippers when out of bed  (Pended)   -JR      Recorded by [JR] Marlys Mckeon, PT Student 01/19/19 1059      Row Name 01/19/19 1023             Safety Issues, Functional Mobility    Safety Issues Affecting Function (Mobility)  safety precaution awareness;safety precautions follow-through/compliance  (Pended)   -JR      Impairments Affecting Function (Mobility)  balance;endurance/activity tolerance;pain;shortness of breath;strength  (Pended)   -JR      Recorded by [JR] Marlys Mckeon, PT Student 01/19/19 1059      Row Name 01/19/19 1023             Bed Mobility Assessment/Treatment    Bed  Mobility Assessment/Treatment  sit-supine  (Pended)   -JR      Sit-Supine [place] (Bed Mobility)  moderate assist (50% patient effort);2 person assist  (Pended)   -JR      Bed Mobility, Safety Issues  decreased use of arms for pushing/pulling;decreased use of legs for bridging/pushing  (Pended)   -JR      Comment (Bed Mobility)  modA for trunk & BLE  (Pended)   -JR      Recorded by [JR] Marlys Mckeon, PT Student 01/19/19 1059      Row Name 01/19/19 1023             Sit-Stand Transfer    Sit-Stand [place] (Transfers)  minimum assist (75% patient effort);verbal cues  (Pended)   -JR      Recorded by [JR] Marlys Mckeon, PT Student 01/19/19 1059      Row Name 01/19/19 1023             Stand-Sit Transfer    Stand-Sit [place] (Transfers)  minimum assist (75% patient effort);verbal cues  (Pended)   -JR      Recorded by [JR] Marlys Mckeon, PT Student 01/19/19 1059      Row Name 01/19/19 1023             Gait/Stairs Assessment/Training    [place] Level (Gait)  minimum assist (75% patient effort);2 person assist;verbal cues  (Pended)   -JR      Assistive Device (Gait)  other (see comments)  (Pended)  HHA of RUE  -JR      Distance in Feet (Gait)  140  (Pended)   -JR      Pattern (Gait)  step-through  (Pended)   -JR      Deviations/Abnormal Patterns (Gait)  deborah decreased;stride length decreased  (Pended)   -JR      Bilateral Gait Deviations  forward flexed posture;heel strike decreased  (Pended)   -JR      Comment (Gait/Stairs)  VCs for upright posture, PLB. 3 standing rest breaks d/t fatigue and increased incisional pain.  (Pended)   -JR      Recorded by [JR] Marlys Mckeon, PT Student 01/19/19 1059      Row Name 01/19/19 1023             Motor Skills Assessment/Interventions    Additional Documentation  Balance (Group);Balance Interventions (Group);Therapeutic Exercise (Group);Therapeutic Exercise Interventions (Group)  (Pended)   -JR      Recorded by [] Marlys Mckeon PT Student 01/19/19  1059      Row Name 01/19/19 1023             Therapeutic Exercise    21862 - PT Therapeutic Activity Minutes  27  (Pended)   -JR      Recorded by [JR] Marlys Mckeon, PT Student 01/19/19 1100      Row Name 01/19/19 1023             Therapeutic Exercise    Upper Extremity Range of Motion (Therapeutic Exercise)  elbow flexion/extension, bilateral;shoulder abduction/adduction, bilateral  (Pended)   -JR      Lower Extremity (Therapeutic Exercise)  LAQ (long arc quad), bilateral;marching while seated  (Pended)   -JR      Lower Extremity Range of Motion (Therapeutic Exercise)  ankle dorsiflexion/plantar flexion, bilateral  (Pended)   -JR      Exercise Type (Therapeutic Exercise)  AROM (active range of motion)  (Pended)   -JR      Position (Therapeutic Exercise)  seated;supine  (Pended)   -JR      Sets/Reps (Therapeutic Exercise)  1/10  (Pended)   -JR      Comment (Therapeutic Exercise)  rests b/w sets  (Pended)   -JR      Recorded by [JR] Marlys Mckeon, PT Student 01/19/19 1100      Row Name 01/19/19 1023             Balance    Balance  static sitting balance;static standing balance  (Pended)   -JR      Recorded by [JR] Marlys Mckeon, PT Student 01/19/19 1100      Row Name 01/19/19 1023             Static Sitting Balance    Level of [place] (Unsupported Sitting, Static Balance)  contact guard assist  (Pended)   -JR      Sitting Position (Unsupported Sitting, Static Balance)  sitting in chair  (Pended)   -JR      Time Able to Maintain Position (Unsupported Sitting, Static Balance)  3 to 4 minutes  (Pended)   -JR      Comment (Unsupported Sitting, Static Balance)  therex  (Pended)   -JR      Recorded by [JR] Marlys Mckeon, PT Student 01/19/19 1103      Row Name 01/19/19 1023             Static Standing Balance    Level of [place] (Supported Standing, Static Balance)  minimal assist, 75% patient effort  (Pended)   -JR      Time Able to Maintain Position (Supported Standing, Static Balance)  3 to 4 minutes   (Pended)   -JR      Assistive Device Utilized (Supported Standing, Static Balance)  other (see comments)  (Pended)  HHA of RUE  -JR      Comment (Supported Standing, Static Balance)  PLB, trunk/neck ext  (Pended)   -JR      Recorded by [JR] Marlys Mckeon, PT Student 01/19/19 1103      Row Name 01/19/19 1023             Positioning and Restraints    Pre-Treatment Position  sitting in chair/recliner  (Pended)   -JR      Post Treatment Position  bed  (Pended)   -JR      In Bed  notified nsg;supine;call light within reach;encouraged to call for assist;SCD pump applied  (Pended)   -JR      Recorded by [JR] Marlys Mckeon, PT Student 01/19/19 1103      Row Name 01/19/19 1023             Pain Assessment    Additional Documentation  Pain Scale: Numbers Pre/Post-Treatment (Group)  (Pended)   -JR      Recorded by [JR] Marlys Mckeon, PT Student 01/19/19 1103      Row Name 01/19/19 1023             Pain Scale: Numbers Pre/Post-Treatment    Pain Scale: Numbers, Pretreatment  2/10  (Pended)   -      Pain Scale: Numbers, Post-Treatment  6/10  (Pended)   -JR      Pain Location - Orientation  incisional  (Pended)   -JR      Pain Location  chest  (Pended)   -JR      Pre/Post Treatment Pain Comment  5/10 at end of session  (Pended)   -JR      Pain Intervention(s)  Repositioned;Rest  (Pended)   -JR      Recorded by [JR] Marlys Mckeon, PT Student 01/19/19 1104      Row Name                Wound 01/17/19 0754 chest incision    Wound - Properties Group Date first assessed: 01/17/19 [JOVI] Time first assessed: 0754 [JOVI] Location: chest [JOVI] Type: incision [JOVI] Recorded by:  [JOVI] Smitha Bunn RN 01/17/19 0754    Row Name                Wound 01/17/19 0754 Right leg incision    Wound - Properties Group Date first assessed: 01/17/19 [JOVI] Time first assessed: 0754 [JOVI] Side: Right [JOVI] Location: leg [JOVI] Type: incision [JOVI] Recorded by:  [JOVI] Smitha Bunn RN 01/17/19 0754    Row Name 01/19/19 1023             Plan of Care Review     Plan of Care Reviewed With  patient  (Pended)   -JR      Recorded by [JR] Marlys Mckeon, PT Student 01/19/19 1104      Row Name 01/19/19 1023             Outcome Summary/Treatment Plan (PT)    Anticipated Discharge Disposition (PT)  home with assist  (Pended)   -JR      Recorded by [JR] Marlys Mckeon, PT Student 01/19/19 1104        User Key  (r) = Recorded By, (t) = Taken By, (c) = Cosigned By    Initials Name Effective Dates Discipline    Smitha Tabares RN 08/01/16 -  Nurse    Marlys Delcid, PT Student 12/06/18 -  PT          Wound 01/17/19 0754 chest incision (Active)   Dressing Appearance open to air 1/19/2019  8:00 AM   Closure Liquid skin adhesive 1/19/2019  8:00 AM   Base dry;clean 1/19/2019  8:00 AM   Periwound dry;intact 1/19/2019  8:00 AM   Periwound Temperature warm 1/19/2019  8:00 AM   Periwound Skin Turgor soft 1/18/2019  6:00 PM   Drainage Amount none 1/19/2019  8:00 AM   Care, Wound cleansed with 1/18/2019  8:00 PM   Dressing Care, Wound open to air 1/19/2019  8:00 AM       Wound 01/17/19 0754 Right leg incision (Active)   Dressing Appearance open to air 1/19/2019  8:00 AM   Closure Liquid skin adhesive 1/19/2019  8:00 AM   Base dressing in place, unable to visualize 1/18/2019  6:00 PM   Care, Wound cleansed with 1/18/2019  8:00 PM   Dressing Care, Wound open to air 1/19/2019  8:00 AM           Physical Therapy Education     Title: PT OT SLP Therapies (Done)     Topic: Physical Therapy (Done)     Point: Mobility training (Done)     Learning Progress Summary           Patient Acceptance, E, VU,NR by  at 1/19/2019 11:04 AM    Comment:  Pt educated on POC, sternal/cardiac precautions, importance of OOB activity  and safe functional mobility.    Acceptance, E, NR by ORESTES at 1/18/2019  2:00 PM                   Point: Home exercise program (Done)     Learning Progress Summary           Patient Acceptance, E, VU,NR by  at 1/19/2019 11:04 AM    Comment:  Pt educated on POC, sternal/cardiac  precautions, importance of OOB activity  and safe functional mobility.    Acceptance, E, NR by ORESTES at 1/18/2019  2:00 PM                   Point: Body mechanics (Done)     Learning Progress Summary           Patient Acceptance, E, VU,NR by  at 1/19/2019 11:04 AM    Comment:  Pt educated on POC, sternal/cardiac precautions, importance of OOB activity  and safe functional mobility.    Acceptance, E, NR by ORESTES at 1/18/2019  2:00 PM                   Point: Precautions (Done)     Learning Progress Summary           Patient Acceptance, E, VU,NR by  at 1/19/2019 11:04 AM    Comment:  Pt educated on POC, sternal/cardiac precautions, importance of OOB activity  and safe functional mobility.    Acceptance, E, NR by ORESTES at 1/18/2019  2:00 PM                               User Key     Initials Effective Dates Name Provider Type Discipline     06/19/15 -  Rosalie Prieto, PT Physical Therapist PT     12/06/18 -  Marlys Mckeon PT Student PT Student PT                PT Recommendation and Plan  Anticipated Discharge Disposition (PT): (P) home with assist  Therapy Frequency (PT Clinical Impression): (P) daily  Outcome Summary/Treatment Plan (PT)  Anticipated Discharge Disposition (PT): (P) home with assist  Plan of Care Reviewed With: (P) patient  Progress: (P) improving  Outcome Summary: (P) Pt able to amb 140' minAx2 and HHA assist of RUE. Pt required 3 standing rests breaks d/t fatigue and increased incisional pain. O2 desat to 91% and max HR at 79. Recommend d/c to home with assist.  Outcome Measures     Row Name 01/19/19 1023 01/18/19 1400          How much help from another person do you currently need...    Turning from your back to your side while in flat bed without using bedrails?  3  (Pended)   -  3  -ORESTES     Moving from lying on back to sitting on the side of a flat bed without bedrails?  2  (Pended)   -  2  -ORESTES     Moving to and from a bed to a chair (including a wheelchair)?  3  (Pended)   -  3  -ORESTES      Standing up from a chair using your arms (e.g., wheelchair, bedside chair)?  3  (Pended)   -  3  -ORESTES     Climbing 3-5 steps with a railing?  1  (Pended)   -JR  1  -ORESTES     To walk in hospital room?  3  (Pended)   -JR  2  -ORESTES     AM-PAC 6 Clicks Score  15  (Pended)   -JR  14  -ORESTES        Functional Assessment    Outcome Measure Options  AM-PAC 6 Clicks Basic Mobility (PT)  (Pended)   -JR  AM-PAC 6 Clicks Basic Mobility (PT)  -ORESTES       User Key  (r) = Recorded By, (t) = Taken By, (c) = Cosigned By    Initials Name Provider Type    Rosalie Levy, PT Physical Therapist    Marlys Delcid, PT Student PT Student         Time Calculation:   PT Charges     Row Name 01/19/19 1023             Time Calculation    Start Time  1023  (Pended)   -      PT Received On  01/19/19  (Pended)   -      PT Goal Re-Cert Due Date  01/28/19  (Pended)   -         Time Calculation- PT    Total Timed Code Minutes- PT  27 minute(s)  (Pended)   -         Timed Charges    71195 - PT Therapeutic Activity Minutes  27  (Pended)   -        User Key  (r) = Recorded By, (t) = Taken By, (c) = Cosigned By    Initials Name Provider Type    Marlys Delcid, PT Student PT Student        Therapy Suggested Charges     Code   Minutes Charges    17556 (CPT®) Hc Pt Neuromusc Re Education Ea 15 Min      59198 (CPT®) Hc Pt Ther Proc Ea 15 Min      19005 (CPT®) Hc Gait Training Ea 15 Min      24263 (CPT®) Hc Pt Therapeutic Act Ea 15 Min 27 2    43262 (CPT®) Hc Pt Manual Therapy Ea 15 Min      46468 (CPT®) Hc Pt Iontophoresis Ea 15 Min      30516 (CPT®) Hc Pt Elec Stim Ea-Per 15 Min      87127 (CPT®) Hc Pt Ultrasound Ea 15 Min      46874 (CPT®) Hc Pt Self Care/Mgmt/Train Ea 15 Min      90477 (CPT®) Hc Pt Prosthetic (S) Train Initial Encounter, Each 15 Min      43613 (CPT®) Hc Pt Orthotic(S)/Prosthetic(S) Encounter, Each 15 Min      15379 (CPT®) Hc Orthotic(S) Mgmt/Train Initial Encounter, Each 15min      Total  27 2        Therapy Charges  for Today     Code Description Service Date Service Provider Modifiers Qty    23080893721 HC PT THERAPEUTIC ACT EA 15 MIN 1/19/2019 Marlys Mckeon, PT Student GP 2          PT G-Codes  Outcome Measure Options: (P) AM-PAC 6 Clicks Basic Mobility (PT)  AM-PAC 6 Clicks Score: (P) 15    Marlys Mckeon, PT Student  1/19/2019

## 2019-01-19 NOTE — PLAN OF CARE
Problem: Patient Care Overview  Goal: Plan of Care Review  Outcome: Ongoing (interventions implemented as appropriate)   01/19/19 1023   Coping/Psychosocial   Plan of Care Reviewed With patient   Plan of Care Review   Progress improving   OTHER   Outcome Summary Pt able to amb 140' minAx2 and HHA assist of RUE. Pt required 3 standing rests breaks d/t fatigue and increased incisional pain. O2 desat to 91% and max HR at 79. Recommend d/c to home with assist.

## 2019-01-19 NOTE — PLAN OF CARE
Problem: Patient Care Overview  Goal: Plan of Care Review  Outcome: Ongoing (interventions implemented as appropriate)   01/19/19 1513   Coping/Psychosocial   Plan of Care Reviewed With patient;family   Plan of Care Review   Progress improving   OTHER   Outcome Summary pt remains in icu progressing well. chest tubes and wires d/c'd. levo weaned off. advancing diet as tolerated. remains on 1-2L NC. pulm tolieting. minimal n/v today.       Problem: Cardiac Surgery (Adult)  Goal: Signs and Symptoms of Listed Potential Problems Will be Absent, Minimized or Managed (Cardiac Surgery)  Outcome: Ongoing (interventions implemented as appropriate)   01/19/19 1513   Goal/Outcome Evaluation   Problems Assessed (Cardiac Surgery) all   Problems Present (Cardiac Surgery) postoperative nausea and vomiting;hemodynamic instability;respiratory compromise;situational response       Problem: Fall Risk (Adult)  Goal: Identify Related Risk Factors and Signs and Symptoms  Outcome: Ongoing (interventions implemented as appropriate)   01/19/19 1513   Fall Risk (Adult)   Related Risk Factors (Fall Risk) fatigue/slow reaction;environment unfamiliar   Signs and Symptoms (Fall Risk) presence of risk factors       Problem: Skin Injury Risk (Adult)  Goal: Identify Related Risk Factors and Signs and Symptoms  Outcome: Ongoing (interventions implemented as appropriate)   01/19/19 1513   Skin Injury Risk (Adult)   Related Risk Factors (Skin Injury Risk) critical care admission;edema;mobility impaired;tissue perfusion altered     Goal: Skin Health and Integrity  Outcome: Ongoing (interventions implemented as appropriate)   01/19/19 1513   Skin Injury Risk (Adult)   Skin Health and Integrity making progress toward outcome

## 2019-01-19 NOTE — PROGRESS NOTES
CTS Progress Note       LOS: 2 days   Patient Care Team:  Mario Bashir MD as PCP - General (Internal Medicine)    Chief Complaint: Coronary artery disease    Vital Signs:  Temp:  [97.7 °F (36.5 °C)-99.4 °F (37.4 °C)] 98.6 °F (37 °C)  Heart Rate:  [57-71] 63  Resp:  [12-20] 18  BP: ()/(39-68) 126/61    Physical Exam:  Alert.  Conversant.  Breathing unlabored.  No neurologic deficits.  No chest or back pain of significance.     Results:   Results from last 7 days   Lab Units 01/19/19  0405   WBC 10*3/mm3 11.04*   HEMOGLOBIN g/dL 8.9*   HEMATOCRIT % 26.5*   PLATELETS 10*3/mm3 152     Results from last 7 days   Lab Units 01/19/19  0405   SODIUM mmol/L 139   POTASSIUM mmol/L 4.0   CHLORIDE mmol/L 109   CO2 mmol/L 22.0   BUN mg/dL 25*   CREATININE mg/dL 1.25   GLUCOSE mg/dL 122*   CALCIUM mg/dL 8.3*           Imaging Results (last 24 hours)     Procedure Component Value Units Date/Time    XR Chest 1 View [[id number]] Updated:  01/19/19 0521    XR Chest 1 View [[id number]] Collected:  01/18/19 0830     Updated:  01/18/19 2127    Narrative:       EXAMINATION: XR CHEST 1 VW-      INDICATION: Postop heart surgery; I20.0-Unstable angina.      COMPARISON: 01/17/2019.     FINDINGS: ET tube and NG tube have been removed. PA catheter remains in  the main pulmonary artery segment. The heart is upper limits of normal  size. Vasculature appears normal. Lungs are clear except for minimal  remaining bibasilar discoid atelectasis. No pneumothorax is appreciated.            Impression:       Post extubation chest radiograph with minimal remaining  bibasilar atelectasis. No new chest disease is seen.     D:  01/18/2019  E:  01/18/2019     This report was finalized on 1/18/2019 9:25 PM by DR. Sridhar Cortes MD.       XR Chest 1 View [[id number]] Collected:  01/18/19 1724     Updated:  01/18/19 1858    Narrative:          EXAMINATION: XR CHEST 1 VW - 1/18/2019     INDICATION: Z74.09-Other reduced mobility; I20.0-Unstable angina.       COMPARISON: Chest x-ray earlier same day.     FINDINGS: Stable appearance of convex margin left superior mediastinum  from prior comparison earlier same day. Support hardware demonstrates  interval removal of pulmonary arterial catheter with right internal  jugular sheath remaining in place;  otherwise support hardware unchanged  including pleural mediastinal drains in place. Bibasilar atelectasis  along with trace central pulmonary vascular congestion. No pneumothorax.            Impression:       Stable appearance of left superior mediastinal convex margin  and enlarged vascular pedicle from prior comparison earlier same day.  Interval removal of central pulmonary arterial catheter with right  internal jugular sheath remaining in place.     DICTATED:   1/18/2019  EDITED/ls :   1/18/2019                 Assessment      Severe CAD with occluded RCA and 90% left main    Unstable angina (CMS/HCC)    S/P CABG x 4 on 1/17/19    Hypertension    Hyperlipidemia    Hypothyroidism on replacement    Hemoglobin has remained stable.  Creatinine has returned to baseline.  We will discontinue chest tubes and a Castillo catheter today but leave in the intensive care unit    Plan   Discontinue chest tubes and pacer wires.  Discontinue Castillo catheter.  CBC and chest x-ray in the a.m.    Please note that portions of this note were completed with a voice recognition program. Efforts were made to edit the dictations, but occasionally words are mistranscribed.    Gato Coppola MD  01/19/19  6:48 AM

## 2019-01-19 NOTE — PROGRESS NOTES
Intensivist Note     1/19/2019  Hospital Day: 2  2 Days Post-Op      Mr. Yosef Hardy, 73 y.o. 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 Patient had apparently received chelation therapy for vascular disease.     Patient subsequently presented with a several month history of exercise-induced chest discomfort manifesting as tightness radiating to the neck and jaw. This also occurred after a large meal. He also noted associated dyspnea with this discomfort. Underwent a stress echo was abnormal and was subsequently referred to Dr. Piedra for cardiac catheterization. Catheterization was performed 1/16/19 and revealed critical coronary disease with an occluded RCA and a 90% left main.     Patient underwent uneventful CABG ×4 on 1/17/19. He received 1 unit of PRBC, 2 platelets, and Feiba prior to arriving in the ICU. He did well with ventilator weaning and was easily extubated at 1600. The evening of 1/17/90 he developed a period of accelerated junctional rhythm but quickly reverted to sinus rhythm. He had some ventricular arrhythmias as well on 1/17/19 but this resolved with removal of [place]. Remains on low-dose norepinephrine today but dopamine has been discontinued. Blood pressure looks good this morning and should be up to come off norepinephrine. No problem with blood sugars off insulin drip (he is not a diabetic). He remains on 2-1/2 L but O2 sat is 96% and this should be able to be weaned as well. No complaints of dyspnea at this time and plans are to pull MT/CT tubes today which should allow improved mobilization. States that he has not had a bowel  "movement.      The patient's relevant past medical, surgical and social history were reviewed and updated in Epic as appropriate.    OBJECTIVE     /66 (BP Location: Right arm, Patient Position: Lying)   Pulse 74   Temp 98.2 °F (36.8 °C) (Oral)   Resp 16   Ht 170.2 cm (67\")   Wt 83.9 kg (185 lb)   SpO2 95%   BMI 28.98 kg/m²   Flow (L/min): 1    Flowsheet Rows      First Filed Value   Admission Height  170.2 cm (67\") Documented at 01/16/2019 1200   Admission Weight  84.2 kg (185 lb 10 oz) Documented at 01/16/2019 1200        Intake & Output (last day)       01/18 0701 - 01/19 0700 01/19 0701 - 01/20 0700    P.O. 390     I.V. (mL/kg) 1181 (14.1)     Blood      IV Piggyback 100     Total Intake(mL/kg) 1671 (19.9)     Urine (mL/kg/hr) 920 (0.5) 450 (0.5)    Blood      Chest Tube 600 90    Total Output 1520 540    Net +151 -540                Exam:  General Exam:  Well-developed elderly appearing white male in NAD  HEENT: Pupils equal and reactive. Nose and throat clear.  Neck:                          Supple, no JVD, thyromegaly, or adenopathy. Right IJ line in place  Lungs: Clear anteriorly and laterally  Chest:                          4 MT/CT tubes in place. Total of 600 mL out tubes over the last 24 hours  Cardiovascular: RRR without murmurs or gallops. HR 67 BPM  Abdomen: Soft nontender without organomegaly or masses.   and rectal: Deferred.  Extremities: No cyanosis clubbing edema.  Neurologic:                 Symmetric strength but weak diffusely. No focal deficits. Leiby but easily arousable and oriented ×3    Chest X-Ray: Normal heart sounds. Reduced lung volumes due to poor inspiration. MT/CT tubes in position. Right IJ Cordis in position. Mild bibasilar atelectasis left greater than right      Results from last 7 days   Lab Units 01/19/19  0405 01/18/19  2149 01/18/19  0352 01/17/19  2200   WBC 10*3/mm3 11.04*  --  12.15* 16.66*   HEMOGLOBIN g/dL 8.9* 9.3* 9.0* 10.1*   HEMATOCRIT % 26.5* 27.3* " 27.1* 29.4*   PLATELETS 10*3/mm3 152  --  194 228     Results from last 7 days   Lab Units 01/19/19  0405 01/18/19  0352   SODIUM mmol/L 139 139   POTASSIUM mmol/L 4.0 4.2   CHLORIDE mmol/L 109 109   CO2 mmol/L 22.0 22.0   BUN mg/dL 25* 20   CREATININE mg/dL 1.25 1.84*   GLUCOSE mg/dL 122* 116*   CALCIUM mg/dL 8.3* 8.3*     Results from last 7 days   Lab Units 01/18/19  0352 01/17/19  1459 01/17/19  1146   MAGNESIUM mg/dL 2.7 2.8* 3.7*   PHOSPHORUS mg/dL 3.8 3.1 2.7           No results found for: SEDRATE  No results found for: BNP  No results found for: CKTOTAL, CKMB, CKMBINDEX, TROPONINI, TROPONINT  No results found for: TSH  No results found for: LACTATE  No results found for: CORTISOL    Results from last 7 days   Lab Units 01/17/19  1551 01/17/19  1140 01/17/19  1051 01/17/19  1031 01/17/19  0954   PH, ARTERIAL pH units 7.333* 7.411 7.48 7.52 7.39   PCO2, ARTERIAL mm Hg 49.7 39.7  --   --   --    PO2 ART mm Hg 99.4 311.0*  --   --   --    HCO3 ART mmol/L 26.4* 25.2  --   --   --    FIO2 % 40 100  --   --   --        I reviewed the patient's results, images and medication.    Assessment/Plan   ASSESSMENT        Severe CAD with occluded RCA and 90% left main    Unstable angina (CMS/[facility])    S/P CABG x 4 on 1/17/19    Hypertension    Hyperlipidemia    Hypothyroidism on replacement      DISCUSSION: Doing extremely well and MT/CT tubes are to be pulled today. Renal function to component after surgery but has recovered today. Adequate urine output and creatinine has dropped from 1.84, to 1.25    PLAN     1. Mobilization and pulmonary toilet  2. Recheck labs in a.m. regarding renal function  3. Per surgery and cardiology    Plan of care and goals reviewed with mulitdisciplinary team at daily rounds.    I discussed the patient's findings and my recommendations with patient and nursing staff    Time spent Critical care 20 min (It does not include procedure time).    Yosef Donahue MD  Intensive Care Medicine  01/19/19  5:18 PM

## 2019-01-19 NOTE — PLAN OF CARE
Problem: Patient Care Overview  Goal: Plan of Care Review  Outcome: Ongoing (interventions implemented as appropriate)   01/19/19 0600   Coping/Psychosocial   Plan of Care Reviewed With patient   Plan of Care Review   Progress improving   OTHER   Outcome Summary slept off and on through the night. vitals stable no c/o nausea. no pacing noted. cont. to monitor urine output       Problem: Cardiac Surgery (Adult)  Goal: Signs and Symptoms of Listed Potential Problems Will be Absent, Minimized or Managed (Cardiac Surgery)  Outcome: Ongoing (interventions implemented as appropriate)   01/19/19 0600   Goal/Outcome Evaluation   Problems Assessed (Cardiac Surgery) all   Problems Present (Cardiac Surgery) postoperative nausea and vomiting;hemodynamic instability;respiratory compromise;situational response       Problem: Fall Risk (Adult)  Goal: Identify Related Risk Factors and Signs and Symptoms  Outcome: Ongoing (interventions implemented as appropriate)   01/19/19 0600   Fall Risk (Adult)   Related Risk Factors (Fall Risk) fatigue/slow reaction;homeostatic imbalance;environment unfamiliar   Signs and Symptoms (Fall Risk) presence of risk factors       Problem: Skin Injury Risk (Adult)  Goal: Identify Related Risk Factors and Signs and Symptoms  Outcome: Ongoing (interventions implemented as appropriate)   01/19/19 0801   Skin Injury Risk (Adult)   Related Risk Factors (Skin Injury Risk) critical care admission;edema;mobility impaired;tissue perfusion altered     Goal: Skin Health and Integrity  Outcome: Ongoing (interventions implemented as appropriate)   01/19/19 0600   Skin Injury Risk (Adult)   Skin Health and Integrity making progress toward outcome

## 2019-01-20 ENCOUNTER — APPOINTMENT (OUTPATIENT)
Dept: GENERAL RADIOLOGY | Facility: HOSPITAL | Age: 74
End: 2019-01-20

## 2019-01-20 LAB
ANION GAP SERPL CALCULATED.3IONS-SCNC: 5 MMOL/L (ref 3–11)
BUN BLD-MCNC: 28 MG/DL (ref 9–23)
BUN/CREAT SERPL: 30.8 (ref 7–25)
CALCIUM SPEC-SCNC: 8.3 MG/DL (ref 8.7–10.4)
CHLORIDE SERPL-SCNC: 110 MMOL/L (ref 99–109)
CO2 SERPL-SCNC: 25 MMOL/L (ref 20–31)
CREAT BLD-MCNC: 0.91 MG/DL (ref 0.6–1.3)
DEPRECATED RDW RBC AUTO: 44.4 FL (ref 37–54)
ERYTHROCYTE [DISTWIDTH] IN BLOOD BY AUTOMATED COUNT: 13.5 % (ref 11.3–14.5)
GFR SERPL CREATININE-BSD FRML MDRD: 81 ML/MIN/1.73
GLUCOSE BLD-MCNC: 95 MG/DL (ref 70–100)
GLUCOSE BLDC GLUCOMTR-MCNC: 120 MG/DL (ref 70–130)
GLUCOSE BLDC GLUCOMTR-MCNC: 132 MG/DL (ref 70–130)
GLUCOSE BLDC GLUCOMTR-MCNC: 92 MG/DL (ref 70–130)
GLUCOSE BLDC GLUCOMTR-MCNC: 95 MG/DL (ref 70–130)
HCT VFR BLD AUTO: 25.4 % (ref 38.9–50.9)
HGB BLD-MCNC: 8.6 G/DL (ref 13.1–17.5)
MCH RBC QN AUTO: 30.7 PG (ref 27–31)
MCHC RBC AUTO-ENTMCNC: 33.9 G/DL (ref 32–36)
MCV RBC AUTO: 90.7 FL (ref 80–99)
PLATELET # BLD AUTO: 152 10*3/MM3 (ref 150–450)
PMV BLD AUTO: 10.8 FL (ref 6–12)
POTASSIUM BLD-SCNC: 3.9 MMOL/L (ref 3.5–5.5)
RBC # BLD AUTO: 2.8 10*6/MM3 (ref 4.2–5.76)
SODIUM BLD-SCNC: 140 MMOL/L (ref 132–146)
WBC NRBC COR # BLD: 8.57 10*3/MM3 (ref 3.5–10.8)

## 2019-01-20 PROCEDURE — 25010000002 ONDANSETRON PER 1 MG: Performed by: PHYSICIAN ASSISTANT

## 2019-01-20 PROCEDURE — 71046 X-RAY EXAM CHEST 2 VIEWS: CPT

## 2019-01-20 PROCEDURE — 99024 POSTOP FOLLOW-UP VISIT: CPT | Performed by: THORACIC SURGERY (CARDIOTHORACIC VASCULAR SURGERY)

## 2019-01-20 PROCEDURE — 85027 COMPLETE CBC AUTOMATED: CPT | Performed by: PHYSICIAN ASSISTANT

## 2019-01-20 PROCEDURE — 97530 THERAPEUTIC ACTIVITIES: CPT

## 2019-01-20 PROCEDURE — 99233 SBSQ HOSP IP/OBS HIGH 50: CPT | Performed by: INTERNAL MEDICINE

## 2019-01-20 PROCEDURE — 82962 GLUCOSE BLOOD TEST: CPT

## 2019-01-20 PROCEDURE — 80048 BASIC METABOLIC PNL TOTAL CA: CPT | Performed by: PHYSICIAN ASSISTANT

## 2019-01-20 RX ADMIN — ATORVASTATIN CALCIUM 40 MG: 40 TABLET, FILM COATED ORAL at 22:14

## 2019-01-20 RX ADMIN — FAMOTIDINE 20 MG: 20 TABLET ORAL at 22:14

## 2019-01-20 RX ADMIN — METOPROLOL TARTRATE 12.5 MG: 25 TABLET, FILM COATED ORAL at 22:14

## 2019-01-20 RX ADMIN — ONDANSETRON 4 MG: 2 INJECTION INTRAMUSCULAR; INTRAVENOUS at 12:04

## 2019-01-20 RX ADMIN — BISACODYL 10 MG: 10 SUPPOSITORY RECTAL at 15:30

## 2019-01-20 RX ADMIN — THYROID, PORCINE 45 MG: 30 TABLET ORAL at 06:54

## 2019-01-20 RX ADMIN — FAMOTIDINE 20 MG: 20 TABLET ORAL at 08:06

## 2019-01-20 RX ADMIN — METOPROLOL TARTRATE 12.5 MG: 25 TABLET, FILM COATED ORAL at 08:06

## 2019-01-20 RX ADMIN — SENNOSIDES AND DOCUSATE SODIUM 2 TABLET: 8.6; 5 TABLET ORAL at 08:06

## 2019-01-20 RX ADMIN — ASPIRIN 325 MG: 325 TABLET, DELAYED RELEASE ORAL at 08:06

## 2019-01-20 NOTE — PROGRESS NOTES
Intensivist Note     1/20/2019  Hospital Day: 3  3 Days Post-Op      Mr. Yosef Hardy, 73 y.o. 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 Patient has apparently received chelation therapy for vascular disease!!     Patient subsequently presented with a several month history of exercise-induced chest discomfort manifesting as tightness radiating to the neck and jaw. This also occurred after a large meal. He also noted associated dyspnea with this discomfort. Underwent a stress echo which was abnormal and was subsequently referred to Dr. Piedra for cardiac catheterization. Catheterization was performed 1/16/19 and revealed critical coronary disease with an occluded RCA and a 90% left main.     Patient underwent uneventful CABG ×4 on 1/17/19. He received 1 unit of PRBC, 2 platelets, and Feiba prior to arriving in the ICU. He did well with ventilator weaning and was easily extubated at 1600. The evening of 1/17/19 he developed a period of accelerated junctional rhythm but quickly reverted to sinus rhythm. He had some ventricular arrhythmias as well on 1/17/19 but this resolved with removal of [place].  was initially on low-dose norepinephrine and dopamine but these have both been discontinued. No problem with blood sugars off insulin drip (he is not a diabetic). Presently on 2 L nasal oxygen but this is being weaned. MT/CT tubes were removed 1/19/19 without difficulty. Today patient has no complaints of dyspnea, cough, wheezing, and chest discomfort is tolerable. Has some nausea but no vomiting. Walking in trent without difficulty. Continues to be severely constipated  "with no bowel movement for 5 days.    The patient's relevant past medical, surgical and social history were reviewed and updated in Epic as appropriate.    OBJECTIVE     /65   Pulse 74   Temp 97.8 °F (36.6 °C) (Oral)   Resp 18   Ht 170.2 cm (67\")   Wt 83.9 kg (185 lb)   SpO2 94%   BMI 28.98 kg/m²   Flow (L/min): 2    Flowsheet Rows      First Filed Value   Admission Height  170.2 cm (67\") Documented at 01/16/2019 1200   Admission Weight  84.2 kg (185 lb 10 oz) Documented at 01/16/2019 1200        Intake & Output (last day)       01/19 0701 - 01/20 0700 01/20 0701 - 01/21 0700    P.O. 700     I.V. (mL/kg)      IV Piggyback      Total Intake(mL/kg) 700 (8.3)     Urine (mL/kg/hr) 1190 (0.6) 200 (0.5)    Chest Tube 90     Total Output 1280 200    Net -580 -200                Exam:  General Exam:  Well-developed somewhat withdrawn elderly white male sitting up in chair in NAD  HEENT: Pupils equal and reactive. Nose and throat clear.  Neck:                          Supple, no JVD, thyromegaly, or adenopathy. Right IJ line in place  Lungs: Clear to auscultation and percussion anteriorly and posteriorly.  Cardiovascular: RRR without murmurs or gallops. HR 78 bpm  Abdomen: Soft nontender without organomegaly or masses.   and rectal: Deferred.  Extremities: No cyanosis clubbing edema.  Neurologic:                 Symmetric strength. No focal deficits.    Chest X-Ray: Pending      Results from last 7 days   Lab Units 01/20/19  0349 01/19/19  0405 01/18/19  2149 01/18/19  0352   WBC 10*3/mm3 8.57 11.04*  --  12.15*   HEMOGLOBIN g/dL 8.6* 8.9* 9.3* 9.0*   HEMATOCRIT % 25.4* 26.5* 27.3* 27.1*   PLATELETS 10*3/mm3 152 152  --  194     Results from last 7 days   Lab Units 01/20/19  0349 01/19/19  0405   SODIUM mmol/L 140 139   POTASSIUM mmol/L 3.9 4.0   CHLORIDE mmol/L 110* 109   CO2 mmol/L 25.0 22.0   BUN mg/dL 28* 25*   CREATININE mg/dL 0.91 1.25   GLUCOSE mg/dL 95 122*   CALCIUM mg/dL 8.3* 8.3*     Results from " last 7 days   Lab Units 01/18/19  0352 01/17/19  1459 01/17/19  1146   MAGNESIUM mg/dL 2.7 2.8* 3.7*   PHOSPHORUS mg/dL 3.8 3.1 2.7           No results found for: SEDRATE  No results found for: BNP  No results found for: CKTOTAL, CKMB, CKMBINDEX, TROPONINI, TROPONINT  No results found for: TSH  No results found for: LACTATE  No results found for: CORTISOL    Results from last 7 days   Lab Units 01/17/19  1551 01/17/19  1140 01/17/19  1051 01/17/19  1031 01/17/19  0954   PH, ARTERIAL pH units 7.333* 7.411 7.48 7.52 7.39   PCO2, ARTERIAL mm Hg 49.7 39.7  --   --   --    PO2 ART mm Hg 99.4 311.0*  --   --   --    HCO3 ART mmol/L 26.4* 25.2  --   --   --    FIO2 % 40 100  --   --   --        I reviewed the patient's results, images and medication.    Assessment/Plan   ASSESSMENT        Severe CAD with occluded RCA and 90% left main    Unstable angina (CMS/[facility])    S/P CABG x 4 on 1/17/19    Hypertension    Hyperlipidemia    Hypothyroidism on replacement      DISCUSSION: Appears to be doing quite well. Tubes are out and cardiology feels he can move to telemetry. Will defer to surgery.    PLAN     1. Repeat a good PA and lateral film  2. Cathartics as necessary  3. No history of diabetes and blood sugars are all controlled so we will discontinue Accu-Cheks    Plan of care and goals reviewed with mulitdisciplinary team at daily rounds.    I discussed the patient's findings and my recommendations with patient and nursing staff    Time spent Critical care 20 min (It does not include procedure time).    Yosef Donahue MD  Intensive Care Medicine  01/20/19 12:08 PM

## 2019-01-20 NOTE — THERAPY TREATMENT NOTE
Acute Care - Physical Therapy Treatment Note  [facility]     Patient Name: Yosef Hardy  : 1945  MRN: [id number]  Today's Date: 2019  Onset of Illness/Injury or Date of Surgery: 19  Date of Referral to PT: 19  Referring Physician: MD Coppola    Admit Date: 2019    Visit Dx:    ICD-10-CM ICD-9-CM   1. Impaired functional mobility, balance, gait, and endurance Z74.09 V49.89   2. Unstable angina (CMS/HCC) I20.0 411.1     Patient Active Problem List   Diagnosis   • Unstable angina (CMS/HCC)   • Severe CAD with occluded RCA and 90% left main   • S/P CABG x 4 on 19   • Hypertension   • Hyperlipidemia   • Hypothyroidism on replacement       Therapy Treatment    Rehabilitation Treatment Summary     Row Name 19 1110             Treatment Time/Intention    Discipline  physical therapist  -DM      Document Type  therapy note (daily note)  -DM      Subjective Information  complains of;weakness;pain had laxative; stomach discomfort;c/o no BM X sev days  -DM      Mode of Treatment  individual therapy;physical therapy  -DM      Patient/Family Observations  UIC;TELE,O2,IV,CORDIS  -DM      Therapy Frequency (PT Clinical Impression)  daily  -DM      Patient Effort  good  -DM      Existing Precautions/Restrictions  cardiac;fall;oxygen therapy device and L/min;sternal  -DM      Recorded by [DM] Sandra Bryant, PT 19 1129      Row Name 19 1110             Vital Signs    Pre Systolic BP Rehab  117  -DM      Pre Treatment Diastolic BP  59  -DM      Post Systolic BP Rehab  115  -DM      Post Treatment Diastolic BP  59  -DM      Pretreatment Heart Rate (beats/min)  72  -DM      Intratreatment Heart Rate (beats/min)  80  -DM      Posttreatment Heart Rate (beats/min)  75  -DM      Pre SpO2 (%)  96  -DM      O2 Delivery Pre Treatment  supplemental O2  -DM      Intra SpO2 (%)  94  -DM      O2 Delivery Intra Treatment  supplemental O2  -DM      Post SpO2 (%)  95  -DM      O2 Delivery Post  Treatment  supplemental O2  -DM      Pre Patient Position  Sitting  -DM      Intra Patient Position  Standing  -DM      Post Patient Position  Sitting respires 1000 cc  -DM      Recorded by [DM] Sandra Bryant, PT 01/20/19 1129      Row Name 01/20/19 1110             Cognitive Assessment/Intervention    Additional Documentation  Cognitive Assessment/Intervention (Group)  -DM      Recorded by [DM] Sandra Bryant, PT 01/20/19 1129      Row Name 01/20/19 1110             Cognitive Assessment/Intervention- PT/OT    Affect/Mental Status (Cognitive)  WFL  -DM      Orientation Status (Cognition)  oriented x 4  -DM      Follows Commands (Cognition)  WFL  -DM      Cognitive Function (Cognitive)  safety deficit  -DM      Safety Deficit (Cognitive)  mild deficit;impulsivity;safety precautions follow-through/compliance  -DM      Personal Safety Interventions  fall prevention program maintained;gait belt;nonskid shoes/slippers when out of bed  -DM      Recorded by [DM] Sandra Bryant, PT 01/20/19 1129      Row Name 01/20/19 1110             Safety Issues, Functional Mobility    Impairments Affecting Function (Mobility)  balance;endurance/activity tolerance;pain;shortness of breath;strength  -DM      Recorded by [DM] Sandra Bryant, PT 01/20/19 1129      Row Name 01/20/19 1110             Bed Mobility Assessment/Treatment    Comment (Bed Mobility)  UIC  -DM      Recorded by [DM] Sandra Bryant, PT 01/20/19 1129      Row Name 01/20/19 1110             Transfer Assessment/Treatment    Transfer Assessment/Treatment  sit-stand transfer;stand-sit transfer  -DM      Comment (Transfers)  CUES for HP  -DM      Recorded by [DM] Sandra Bryant, PT 01/20/19 1129      Row Name 01/20/19 1110             Sit-Stand Transfer    Sit-Stand [place] (Transfers)  verbal cues;minimum assist (75% patient effort);2 person assist  -DM      Assistive Device (Sit-Stand Transfers)  other (see comments) GT belt  -DM      Recorded by [DM]  Sandra Bryant, PT 01/20/19 1129      Row Name 01/20/19 1110             Stand-Sit Transfer    Stand-Sit [place] (Transfers)  minimum assist (75% patient effort);verbal cues  -DM      Assistive Device (Stand-Sit Transfers)  other (see comments) gt belt  -DM      Recorded by [DM] Sandra Bryant, PT 01/20/19 1129      Row Name 01/20/19 1110             Gait/Stairs Assessment/Training    [place] Level (Gait)  verbal cues;minimum assist (75% patient effort);1 person to manage equipment  -DM      Assistive Device (Gait)  other (see comments) min HHA of PT;tech for tele.  -DM      Distance in Feet (Gait)  400 2 stand.rests;c/o SOB,stomach discomfort   -DM      Pattern (Gait)  step-through  -DM      Deviations/Abnormal Patterns (Gait)  deborah decreased;stride length decreased DEC step length; cues for PLB  -DM      Bilateral Gait Deviations  forward flexed posture;heel strike decreased  -DM      Recorded by [DM] Sandra Bryant, PT 01/20/19 1129      Row Name 01/20/19 1110             Motor Skills Assessment/Interventions    Additional Documentation  Balance (Group);Balance Interventions (Group);Therapeutic Exercise (Group);Therapeutic Exercise Interventions (Group)  -DM      Recorded by [DM] Sandra Bryant, PT 01/20/19 1129      Row Name 01/20/19 1110             Therapeutic Exercise    27083 - PT Therapeutic Activity Minutes  24  -DM      Recorded by [DM] Sandra Bryant, PT 01/20/19 1129      Row Name 01/20/19 1110             Therapeutic Exercise    Upper Extremity Range of Motion (Therapeutic Exercise)  shoulder flexion/extension, bilateral;shoulder abduction/adduction, bilateral;elbow flexion/extension, bilateral  -DM      Lower Extremity (Therapeutic Exercise)  LAQ (long arc quad), bilateral;marching while seated  -DM      Lower Extremity Range of Motion (Therapeutic Exercise)  ankle dorsiflexion/plantar flexion, bilateral  -DM      Exercise Type (Therapeutic Exercise)  AROM (active range of  motion)  -DM      Position (Therapeutic Exercise)  seated  -DM      Sets/Reps (Therapeutic Exercise)  1/10  -DM      Comment (Therapeutic Exercise)  Rests btwn sets; splinted coughing; onset incr. abdom discomf but decl. BSC for BM  -DM      Recorded by [DM] Sandra Bryant, PT 01/20/19 1129      Row Name 01/20/19 1110             Static Sitting Balance    Level of [place] (Unsupported Sitting, Static Balance)  supervision  -DM      Sitting Position (Unsupported Sitting, Static Balance)  sitting in chair  -DM      Time Able to Maintain Position (Unsupported Sitting, Static Balance)  3 to 4 minutes  -DM      Recorded by [DM] Sandra Bryant, PT 01/20/19 1129      Row Name 01/20/19 1110             Static Standing Balance    Level of [place] (Supported Standing, Static Balance)  minimal assist, 75% patient effort  -DM      Time Able to Maintain Position (Supported Standing, Static Balance)  1 to 2 minutes  -DM      Assistive Device Utilized (Supported Standing, Static Balance)  other (see comments) GT Belt  -DM      Comment (Supported Standing, Static Balance)  trunk/neck ext,PLB  -DM      Recorded by [DM] Sandra Bryant, PT 01/20/19 1129      Row Name 01/20/19 1110             Positioning and Restraints    Pre-Treatment Position  sitting in chair/recliner  -DM      Post Treatment Position  chair  -DM      In Chair  notified nsg;reclined;call light within reach;encouraged to call for assist;RUE elevated;LUE elevated;waffle cushion;legs elevated req. new pulse oxim sensor, ice water,mouth wash (done)  -DM      Recorded by [DM] Sandra Bryant, PT 01/20/19 1129      Row Name 01/20/19 1110             Pain Assessment    Additional Documentation  Pain Scale: Numbers Pre/Post-Treatment (Group)  -DM      Recorded by [DM] Sandra Bryant, PT 01/20/19 1129      Row Name 01/20/19 1110             Pain Scale: Numbers Pre/Post-Treatment    Pain Scale: Numbers, Pretreatment  1/10  -DM      Pain Scale: Numbers,  Post-Treatment  2/10  -DM      Pain Location - Orientation  incisional  -DM      Pain Location  chest  -DM      Pain Intervention(s)  Repositioned;Rest  -DM      Recorded by [DM] Sandra Bryant, PT 01/20/19 1129      Row Name                Wound 01/17/19 0754 chest incision    Wound - Properties Group Date first assessed: 01/17/19 [JOVI] Time first assessed: 0754 [JOVI] Location: chest [JOVI] Type: incision [JOVI] Recorded by:  [JOVI] Smitha Bunn RN 01/17/19 0754    Row Name                Wound 01/17/19 0754 Right leg incision    Wound - Properties Group Date first assessed: 01/17/19 [JOVI] Time first assessed: 0754 [JOVI] Side: Right [JOVI] Location: leg [JOVI] Type: incision [JOVI] Recorded by:  [JOVI] Smitha Bunn RN 01/17/19 0754    Row Name 01/20/19 1110             Plan of Care Review    Plan of Care Reviewed With  patient  -DM      Recorded by [DM] Sandra Bryant, PT 01/20/19 1129      Row Name 01/20/19 1110             Outcome Summary/Treatment Plan (PT)    Daily Summary of Progress (PT)  progress toward functional goals is good  -DM      Anticipated Discharge Disposition (PT)  home with assist  -DM      Recorded by [DM] Sandra Bryant, PT 01/20/19 1129        User Key  (r) = Recorded By, (t) = Taken By, (c) = Cosigned By    Initials Name Effective Dates Discipline    DM Sandra Bryant, PT 06/19/15 -  PT    Smitha Tabares RN 08/01/16 -  Nurse          Wound 01/17/19 0754 chest incision (Active)   Dressing Appearance open to air 1/20/2019  8:00 AM   Closure Liquid skin adhesive 1/20/2019  8:00 AM   Base clean;dry 1/20/2019  8:00 AM   Periwound intact;dry 1/20/2019  8:00 AM   Periwound Temperature warm 1/20/2019  8:00 AM   Periwound Skin Turgor soft 1/20/2019  8:00 AM   Drainage Amount none 1/19/2019  6:00 PM   Care, Wound cleansed with;wound cleanser 1/19/2019  8:00 PM   Dressing Care, Wound open to air 1/19/2019  8:00 PM       Wound 01/17/19 0754 Right leg incision (Active)   Dressing Appearance open to air  1/20/2019  8:00 AM   Closure Liquid skin adhesive 1/20/2019  8:00 AM   Base dry;clean 1/20/2019  8:00 AM   Periwound intact 1/19/2019  8:00 PM   Periwound Temperature warm 1/19/2019  8:00 PM   Periwound Skin Turgor soft 1/19/2019  8:00 PM   Drainage Amount none 1/19/2019  8:00 PM   Care, Wound cleansed with;wound cleanser 1/19/2019  8:00 PM   Dressing Care, Wound open to air 1/19/2019  8:00 PM           Physical Therapy Education     Title: PT OT SLP Therapies (In Progress)     Topic: Physical Therapy (In Progress)     Point: Mobility training (In Progress)     Learning Progress Summary           Patient Eager, E,D, NR by DM at 1/20/2019 11:44 AM    Acceptance, E, VU,NR by  at 1/19/2019 11:04 AM    Comment:  Pt educated on POC, sternal/cardiac precautions, importance of OOB activity  and safe functional mobility.    Acceptance, E, NR by ORESTES at 1/18/2019  2:00 PM                   Point: Home exercise program (In Progress)     Learning Progress Summary           Patient Eager, E,D, NR by DM at 1/20/2019 11:44 AM    Acceptance, E, VU,NR by  at 1/19/2019 11:04 AM    Comment:  Pt educated on POC, sternal/cardiac precautions, importance of OOB activity  and safe functional mobility.    Acceptance, E, NR by ORESTES at 1/18/2019  2:00 PM                   Point: Body mechanics (In Progress)     Learning Progress Summary           Patient Eager, E,D, NR by DM at 1/20/2019 11:44 AM    Acceptance, E, VU,NR by  at 1/19/2019 11:04 AM    Comment:  Pt educated on POC, sternal/cardiac precautions, importance of OOB activity  and safe functional mobility.    Acceptance, E, NR by ORESTES at 1/18/2019  2:00 PM                   Point: Precautions (In Progress)     Learning Progress Summary           Patient Eager, E,D, NR by DM at 1/20/2019 11:44 AM    Acceptance, E, VU,NR by  at 1/19/2019 11:04 AM    Comment:  Pt educated on POC, sternal/cardiac precautions, importance of OOB activity  and safe functional mobility.    Acceptance, E, NR  by ORESTES at 1/18/2019  2:00 PM                               User Key     Initials Effective Dates Name Provider Type Discipline    ORESTES 06/19/15 -  Rosalie Prieto, PT Physical Therapist PT    DM 06/19/15 -  Sandra Bryant, PT Physical Therapist PT     12/06/18 -  Marlys Mckeon, PT Student PT Student PT                PT Recommendation and Plan  Anticipated Discharge Disposition (PT): home with assist  Therapy Frequency (PT Clinical Impression): daily  Outcome Summary/Treatment Plan (PT)  Daily Summary of Progress (PT): progress toward functional goals is good  Anticipated Discharge Disposition (PT): home with assist  Plan of Care Reviewed With: patient  Progress: improving  Outcome Summary: Able to amb 400 ft w/ min HHA of PT, w/1 stand.rests, but limited by inc. SOB, abdom. discomf s/p laxative, & fatigue;  Outcome Measures     Row Name 01/20/19 1110 01/19/19 1023 01/18/19 1400       How much help from another person do you currently need...    Turning from your back to your side while in flat bed without using bedrails?  3  -DM  3  -DM (r) JR (t) DM (c)  3  -ORESTES    Moving from lying on back to sitting on the side of a flat bed without bedrails?  2  -DM  2  -DM (r) JR (t) DM (c)  2  -ORESTES    Moving to and from a bed to a chair (including a wheelchair)?  3  -DM  3  -DM (r) JR (t) DM (c)  3  -ORESTES    Standing up from a chair using your arms (e.g., wheelchair, bedside chair)?  3  -DM  3  -DM (r) JR (t) DM (c)  3  -ORESTES    Climbing 3-5 steps with a railing?  2  -DM  1  -DM (r) JR (t) DM (c)  1  -ORESTES    To walk in hospital room?  3  -DM  3  -DM (r) JR (t) DM (c)  2  -ORESTES    AM-PAC 6 Clicks Score  16  -DM  15  -DM (r) JR (t)  14  -ORESTES       Functional Assessment    Outcome Measure Options  AM-PAC 6 Clicks Basic Mobility (PT)  -DM  AM-PAC 6 Clicks Basic Mobility (PT)  -DM (r) JR (t) DM (c)  AM-PAC 6 Clicks Basic Mobility (PT)  -ORESTES      User Key  (r) = Recorded By, (t) = Taken By, (c) = Cosigned By    Initials Name Provider  Type    Rosalie Levy, PT Physical Therapist    Sandra Strickland, PT Physical Therapist    Marlys Delcid, PT Student PT Student         Time Calculation:   PT Charges     Row Name 01/20/19 1148 01/20/19 1110          Time Calculation    Start Time  1110  -DM  --     PT Received On  01/20/19  -DM  --     PT Goal Re-Cert Due Date  01/28/19  -DM  --        Time Calculation- PT    Total Timed Code Minutes- PT  24 minute(s)  -DM  --        Timed Charges    99095 - PT Therapeutic Activity Minutes  --  24  -DM       User Key  (r) = Recorded By, (t) = Taken By, (c) = Cosigned By    Initials Name Provider Type    Sandra Strickland, PT Physical Therapist        Therapy Suggested Charges     Code   Minutes Charges    80843 (CPT®) Hc Pt Neuromusc Re Education Ea 15 Min      60099 (CPT®) Hc Pt Ther Proc Ea 15 Min      80453 (CPT®) Hc Gait Training Ea 15 Min      68064 (CPT®) Hc Pt Therapeutic Act Ea 15 Min 24 2    78889 (CPT®) Hc Pt Manual Therapy Ea 15 Min      13888 (CPT®) Hc Pt Iontophoresis Ea 15 Min      61310 (CPT®) Hc Pt Elec Stim Ea-Per 15 Min      62319 (CPT®) Hc Pt Ultrasound Ea 15 Min      45804 (CPT®) Hc Pt Self Care/Mgmt/Train Ea 15 Min      28696 (CPT®) Hc Pt Prosthetic (S) Train Initial Encounter, Each 15 Min      78095 (CPT®) Hc Pt Orthotic(S)/Prosthetic(S) Encounter, Each 15 Min      06519 (CPT®) Hc Orthotic(S) Mgmt/Train Initial Encounter, Each 15min      Total  24 2        Therapy Charges for Today     Code Description Service Date Service Provider Modifiers Qty    09324426310 HC PT THERAPEUTIC ACT EA 15 MIN 1/20/2019 Sandra Bryant, PT GP 2    01481562888 HC PT THER SUPP EA 15 MIN 1/20/2019 Sandra Bryant, PT GP 2          PT G-Codes  Outcome Measure Options: AM-PAC 6 Clicks Basic Mobility (PT)  AM-PAC 6 Clicks Score: 16    Sandra Bryant, PT  1/20/2019

## 2019-01-20 NOTE — PLAN OF CARE
Problem: Patient Care Overview  Goal: Plan of Care Review  Outcome: Ongoing (interventions implemented as appropriate)   01/20/19 1145   Coping/Psychosocial   Plan of Care Reviewed With patient   Plan of Care Review   Progress improving   OTHER   Outcome Summary Able to amb 400 ft w/ min HHA of PT, w/1 stand.rests, but limited by inc. SOB, abdom. discomf s/p laxative, & fatigue;

## 2019-01-20 NOTE — PROGRESS NOTES
[facility]       LOS: 3 days   Patient Care Team:  Mario Bashir MD as PCP - General (Internal Medicine)        Subjective       No chest pain shortness breath palpitations or dizziness  Vital Signs  Temp:  [97.8 °F (36.6 °C)-100.1 °F (37.8 °C)] 97.8 °F (36.6 °C)  Heart Rate:  [65-78] 77  Resp:  [16-22] 18  BP: (110-146)/(49-76) 135/68    Intake/Output Summary (Last 24 hours) at 1/20/2019 0958  Last data filed at 1/20/2019 0651  Gross per 24 hour   Intake 700 ml   Output 1250 ml   Net -550 ml     No intake/output data recorded.    Physical Exam:     General Appearance:    A&O, in no acute distress       Neck:   No adenopathy, supple, trachea midline, no thyromegaly, no JVD       Lungs:     Clear to auscultation anteriorly,respirations regular, even and                  unlabored    Heart:    Regular rhythm and normal rate, normal S1 and S2, no            murmur, no gallop, no rub, no click   Chest Wall:    No abnormalities observed   Abdomen:     Normal bowel sounds, no masses, no organomegaly, soft               Extremities:   No edema, no cyanosis, no redness   Pulses:   Pulses palpable and equal bilaterally     Results Review:     I reviewed the patient's new clinical results.      WBC WBC   Date/Time Value Ref Range Status   01/20/2019 0349 8.57 3.50 - 10.80 10*3/mm3 Final   01/19/2019 0405 11.04 (H) 3.50 - 10.80 10*3/mm3 Final   01/18/2019 0352 12.15 (H) 3.50 - 10.80 10*3/mm3 Final   01/17/2019 2200 16.66 (H) 3.50 - 10.80 10*3/mm3 Final   01/17/2019 1459 8.38 3.50 - 10.80 10*3/mm3 Final   01/17/2019 1146 9.37 3.50 - 10.80 10*3/mm3 Final            HGB Hemoglobin   Date/Time Value Ref Range Status   01/20/2019 0349 8.6 (L) 13.1 - 17.5 g/dL Final   01/19/2019 0405 8.9 (L) 13.1 - 17.5 g/dL Final   01/18/2019 2149 9.3 (L) 13.1 - 17.5 g/dL Final   01/18/2019 0352 9.0 (L) 13.1 - 17.5 g/dL Final   01/17/2019 2200 10.1 (L) 13.1 - 17.5 g/dL Final    01/17/2019 1459 8.0 (L) 13.1 - 17.5 g/dL Final   01/17/2019 1146 9.4 (L) 13.1 - 17.5 g/dL Final   01/17/2019 1051 8.8 (L) 12.0 - 17.0 g/dL Final   01/17/2019 1031 8.8 (L) 12.0 - 17.0 g/dL Final           HCT Hematocrit   Date/Time Value Ref Range Status   01/20/2019 0349 25.4 (L) 38.9 - 50.9 % Final   01/19/2019 0405 26.5 (L) 38.9 - 50.9 % Final   01/18/2019 2149 27.3 (L) 38.9 - 50.9 % Final   01/18/2019 0352 27.1 (L) 38.9 - 50.9 % Final   01/17/2019 2200 29.4 (L) 38.9 - 50.9 % Final   01/17/2019 1459 23.4 (L) 38.9 - 50.9 % Final   01/17/2019 1146 27.2 (L) 38.9 - 50.9 % Final   01/17/2019 1051 26 (L) 38 - 51 % Final   01/17/2019 1031 26 (L) 38 - 51 % Final            Platlets Platelets   Date/Time Value Ref Range Status   01/20/2019 0349 152 150 - 450 10*3/mm3 Final   01/19/2019 0405 152 150 - 450 10*3/mm3 Final   01/18/2019 0352 194 150 - 450 10*3/mm3 Final   01/17/2019 2200 228 150 - 450 10*3/mm3 Final   01/17/2019 1459 123 (L) 150 - 450 10*3/mm3 Final   01/17/2019 1146 151 150 - 450 10*3/mm3 Final     Sodium  Sodium   Date/Time Value Ref Range Status   01/20/2019 0349 140 132 - 146 mmol/L Final   01/19/2019 0405 139 132 - 146 mmol/L Final   01/18/2019 0352 139 132 - 146 mmol/L Final   01/17/2019 2200 140 132 - 146 mmol/L Final   01/17/2019 1459 142 132 - 146 mmol/L Final   01/17/2019 1146 140 132 - 146 mmol/L Final     Potassium  Potassium   Date/Time Value Ref Range Status   01/20/2019 0349 3.9 3.5 - 5.5 mmol/L Final   01/19/2019 0405 4.0 3.5 - 5.5 mmol/L Final   01/18/2019 0352 4.2 3.5 - 5.5 mmol/L Final   01/17/2019 2200 3.9 3.5 - 5.5 mmol/L Final   01/17/2019 1459 3.6 3.5 - 5.5 mmol/L Final   01/17/2019 1146 4.2 3.5 - 5.5 mmol/L Final     Chloride  Chloride   Date/Time Value Ref Range Status   01/20/2019 0349 110 (H) 99 - 109 mmol/L Final   01/19/2019 0405 109 99 - 109 mmol/L Final   01/18/2019 0352 109 99 - 109 mmol/L Final   01/17/2019 2200 108 99 - 109 mmol/L Final   01/17/2019 1459 112 (H) 99 - 109  mmol/L Final   01/17/2019 1146 107 99 - 109 mmol/L Final     BicarbonateNo results found for: PLASMABICARB    BUN BUN   Date/Time Value Ref Range Status   01/20/2019 0349 28 (H) 9 - 23 mg/dL Final   01/19/2019 0405 25 (H) 9 - 23 mg/dL Final   01/18/2019 0352 20 9 - 23 mg/dL Final   01/17/2019 2200 19 9 - 23 mg/dL Final   01/17/2019 1459 15 9 - 23 mg/dL Final   01/17/2019 1146 11 9 - 23 mg/dL Final      Creatinine Creatinine   Date/Time Value Ref Range Status   01/20/2019 0349 0.91 0.60 - 1.30 mg/dL Final   01/19/2019 0405 1.25 0.60 - 1.30 mg/dL Final   01/18/2019 0352 1.84 (H) 0.60 - 1.30 mg/dL Final   01/17/2019 2200 1.69 (H) 0.60 - 1.30 mg/dL Final   01/17/2019 1459 1.14 0.60 - 1.30 mg/dL Final   01/17/2019 1146 1.06 0.60 - 1.30 mg/dL Final      Calcium Calcium   Date/Time Value Ref Range Status   01/20/2019 0349 8.3 (L) 8.7 - 10.4 mg/dL Final   01/19/2019 0405 8.3 (L) 8.7 - 10.4 mg/dL Final   01/18/2019 0352 8.3 (L) 8.7 - 10.4 mg/dL Final   01/17/2019 2200 8.8 8.7 - 10.4 mg/dL Final   01/17/2019 1459 7.9 (L) 8.7 - 10.4 mg/dL Final   01/17/2019 1146 9.6 8.7 - 10.4 mg/dL Final      Mag @RESULFAST(MG:3)@        PT/INR:       Lab Results   Component Value Date    PROTIME 16.0 (H) 01/18/2019    PROTIME 15.3 (H) 01/17/2019    PROTIME 13.2 01/17/2019    INR 1.34 (H) 01/18/2019    INR 1.27 (H) 01/17/2019    INR 1.05 01/17/2019      Troponin I:  No results found for: TROPONINI No results found for: CKTOTAL, CKMB, CKMBINDEX, POCRTROPI, TROPONINT      aspirin 325 mg Oral Daily   atorvastatin 40 mg Oral Nightly   famotidine 20 mg Oral BID   insulin lispro 0-7 Units Subcutaneous 4x Daily With Meals & Nightly   metoprolol tartrate 12.5 mg Oral Q12H   ondansetron 4 mg Intravenous Once   pharmacy consult - MTM  Does not apply Daily   sennosides-docusate sodium 2 tablet Oral BID   Thyroid 45 mg Oral Q AM       dextrose 5 % with KCl 20 mEq 30 mL/hr Last Rate: 30 mL/hr (01/17/19 1130)       Assessment/Plan     Patient Active  Problem List   Diagnosis Code   • Unstable angina (CMS/[facility]) I20.0   • Severe CAD with occluded RCA and 90% left main I25.10   • S/P CABG x 4 on 1/17/19 Z95.1   • Hypertension I10   • Hyperlipidemia E78.5   • Hypothyroidism on replacement E07.9     Progressing after CABG   Renal function is improved   Okay with me to telemetry any time   Continue current medications   01/20/19  9:58 AM

## 2019-01-20 NOTE — PROGRESS NOTES
CTS Progress Note       LOS: 3 days   Patient Care Team:  Mario Bashir MD as PCP - General (Internal Medicine)    Chief Complaint: Coronary artery disease    Vital Signs:  Temp:  [98 °F (36.7 °C)-100.1 °F (37.8 °C)] 100 °F (37.8 °C)  Heart Rate:  [65-78] 71  Resp:  [16-22] 18  BP: (110-146)/(49-76) 126/61    Physical Exam:  Alert and conversant and breathing unlabored     Results:   Results from last 7 days   Lab Units 01/20/19  0349   WBC 10*3/mm3 8.57   HEMOGLOBIN g/dL 8.6*   HEMATOCRIT % 25.4*   PLATELETS 10*3/mm3 152     Results from last 7 days   Lab Units 01/20/19  0349   SODIUM mmol/L 140   POTASSIUM mmol/L 3.9   CHLORIDE mmol/L 110*   CO2 mmol/L 25.0   BUN mg/dL 28*   CREATININE mg/dL 0.91   GLUCOSE mg/dL 95   CALCIUM mg/dL 8.3*           Imaging Results (last 24 hours)     Procedure Component Value Units Date/Time    XR Chest 1 View [[id number]] Collected:  01/19/19 0737     Updated:  01/19/19 5853    Narrative:          EXAMINATION: XR CHEST 1 VW - 1/19/2019     INDICATION:  Z74.09-Other reduced mobility; I20.0-Unstable angina.      COMPARISON: Chest x-ray 1/18/2019.     FINDINGS: Support hardware unchanged and in satisfactory positioning.  Lung volumes similar with bibasilar atelectasis and minimal vascular  crowding greatest on the left; however, no new parenchymal process or  significant effusion. No pneumothorax.           Impression:       No significant interval change.     DICTATED:   1/19/2019  EDITED/ls :   1/19/2019      This report was finalized on 1/19/2019 5:41 PM by Dr. Gus Griffith.       XR Chest 1 View [[id number]] Collected:  01/18/19 1724     Updated:  01/19/19 0949    Narrative:          EXAMINATION: XR CHEST 1 VW - 1/18/2019     INDICATION: Z74.09-Other reduced mobility; I20.0-Unstable angina.      COMPARISON: Chest x-ray earlier same day.     FINDINGS: Stable appearance of convex margin left superior mediastinum  from prior comparison earlier same day. Support hardware  demonstrates  interval removal of pulmonary arterial catheter with right internal  jugular sheath remaining in place;  otherwise support hardware unchanged  including pleural mediastinal drains in place. Bibasilar atelectasis  along with trace central pulmonary vascular congestion. No pneumothorax.            Impression:       Stable appearance of left superior mediastinal convex margin  and enlarged vascular pedicle from prior comparison earlier same day.  Interval removal of central pulmonary arterial catheter with right  internal jugular sheath remaining in place.     DICTATED:   1/18/2019  EDITED/ls :   1/18/2019      This report was finalized on 1/19/2019 9:47 AM by Dr. Gus Griffith.             Assessment      Severe CAD with occluded RCA and 90% left main    Unstable angina (CMS/HCC)    S/P CABG x 4 on 1/17/19    Hypertension    Hyperlipidemia    Hypothyroidism on replacement        Plan   Continuing supportive care    Please note that portions of this note were completed with a voice recognition program. Efforts were made to edit the dictations, but occasionally words are mistranscribed.    Gato Coppola MD  01/20/19  7:37 AM

## 2019-01-20 NOTE — PLAN OF CARE
Problem: Patient Care Overview  Goal: Plan of Care Review  Outcome: Ongoing (interventions implemented as appropriate)   01/20/19 0439   Coping/Psychosocial   Plan of Care Reviewed With patient   Plan of Care Review   Progress improving   OTHER   Outcome Summary no nausea tonight. B/P RR stable. 2 L nasal O2 remained in use. denied pain       Problem: Cardiac Surgery (Adult)  Goal: Signs and Symptoms of Listed Potential Problems Will be Absent, Minimized or Managed (Cardiac Surgery)  Outcome: Ongoing (interventions implemented as appropriate)   01/20/19 0439   Goal/Outcome Evaluation   Problems Assessed (Cardiac Surgery) all   Problems Present (Cardiac Surgery) pain;respiratory compromise;situational response       Problem: Fall Risk (Adult)  Goal: Identify Related Risk Factors and Signs and Symptoms  Outcome: Ongoing (interventions implemented as appropriate)   01/20/19 0439   Fall Risk (Adult)   Related Risk Factors (Fall Risk) fatigue/slow reaction;gait/mobility problems;environment unfamiliar   Signs and Symptoms (Fall Risk) presence of risk factors     Goal: Absence of Fall  Outcome: Ongoing (interventions implemented as appropriate)   01/20/19 0439   Fall Risk (Adult)   Absence of Fall making progress toward outcome       Problem: Skin Injury Risk (Adult)  Goal: Identify Related Risk Factors and Signs and Symptoms  Outcome: Ongoing (interventions implemented as appropriate)   01/20/19 0439   Skin Injury Risk (Adult)   Related Risk Factors (Skin Injury Risk) critical care admission;edema;mobility impaired;nutritional deficiencies     Goal: Skin Health and Integrity  Outcome: Ongoing (interventions implemented as appropriate)   01/20/19 0439   Skin Injury Risk (Adult)   Skin Health and Integrity making progress toward outcome

## 2019-01-21 LAB
ABO + RH BLD: NORMAL
ABO + RH BLD: NORMAL
ANION GAP SERPL CALCULATED.3IONS-SCNC: 6 MMOL/L (ref 3–11)
BASOPHILS # BLD AUTO: 0.02 10*3/MM3 (ref 0–0.2)
BASOPHILS NFR BLD AUTO: 0.3 % (ref 0–1)
BH BB BLOOD EXPIRATION DATE: NORMAL
BH BB BLOOD EXPIRATION DATE: NORMAL
BH BB BLOOD TYPE BARCODE: 600
BH BB BLOOD TYPE BARCODE: 600
BH BB DISPENSE STATUS: NORMAL
BH BB DISPENSE STATUS: NORMAL
BH BB PRODUCT CODE: NORMAL
BH BB PRODUCT CODE: NORMAL
BH BB UNIT NUMBER: NORMAL
BH BB UNIT NUMBER: NORMAL
BUN BLD-MCNC: 26 MG/DL (ref 9–23)
BUN/CREAT SERPL: 34.7 (ref 7–25)
CALCIUM SPEC-SCNC: 8.4 MG/DL (ref 8.7–10.4)
CHLORIDE SERPL-SCNC: 108 MMOL/L (ref 99–109)
CO2 SERPL-SCNC: 25 MMOL/L (ref 20–31)
CREAT BLD-MCNC: 0.75 MG/DL (ref 0.6–1.3)
DEPRECATED RDW RBC AUTO: 43.6 FL (ref 37–54)
EOSINOPHIL # BLD AUTO: 0.14 10*3/MM3 (ref 0–0.3)
EOSINOPHIL NFR BLD AUTO: 1.8 % (ref 0–3)
ERYTHROCYTE [DISTWIDTH] IN BLOOD BY AUTOMATED COUNT: 13.3 % (ref 11.3–14.5)
GFR SERPL CREATININE-BSD FRML MDRD: 102 ML/MIN/1.73
GLUCOSE BLD-MCNC: 92 MG/DL (ref 70–100)
GLUCOSE BLDC GLUCOMTR-MCNC: 97 MG/DL (ref 70–130)
HCT VFR BLD AUTO: 25.2 % (ref 38.9–50.9)
HGB BLD-MCNC: 8.7 G/DL (ref 13.1–17.5)
IMM GRANULOCYTES # BLD AUTO: 0.01 10*3/MM3 (ref 0–0.03)
IMM GRANULOCYTES NFR BLD AUTO: 0.1 % (ref 0–0.6)
LYMPHOCYTES # BLD AUTO: 0.94 10*3/MM3 (ref 0.6–4.8)
LYMPHOCYTES NFR BLD AUTO: 12.4 % (ref 24–44)
MCH RBC QN AUTO: 31 PG (ref 27–31)
MCHC RBC AUTO-ENTMCNC: 34.5 G/DL (ref 32–36)
MCV RBC AUTO: 89.7 FL (ref 80–99)
MONOCYTES # BLD AUTO: 0.86 10*3/MM3 (ref 0–1)
MONOCYTES NFR BLD AUTO: 11.3 % (ref 0–12)
NEUTROPHILS # BLD AUTO: 5.65 10*3/MM3 (ref 1.5–8.3)
NEUTROPHILS NFR BLD AUTO: 74.2 % (ref 41–71)
PLATELET # BLD AUTO: 185 10*3/MM3 (ref 150–450)
PMV BLD AUTO: 10.5 FL (ref 6–12)
POTASSIUM BLD-SCNC: 3.5 MMOL/L (ref 3.5–5.5)
RBC # BLD AUTO: 2.81 10*6/MM3 (ref 4.2–5.76)
SODIUM BLD-SCNC: 139 MMOL/L (ref 132–146)
UNIT  ABO: NORMAL
UNIT  ABO: NORMAL
UNIT  RH: NORMAL
UNIT  RH: NORMAL
WBC NRBC COR # BLD: 7.61 10*3/MM3 (ref 3.5–10.8)

## 2019-01-21 PROCEDURE — 99233 SBSQ HOSP IP/OBS HIGH 50: CPT | Performed by: INTERNAL MEDICINE

## 2019-01-21 PROCEDURE — 82962 GLUCOSE BLOOD TEST: CPT

## 2019-01-21 PROCEDURE — 80048 BASIC METABOLIC PNL TOTAL CA: CPT | Performed by: PHYSICIAN ASSISTANT

## 2019-01-21 PROCEDURE — 85025 COMPLETE CBC W/AUTO DIFF WBC: CPT | Performed by: INTERNAL MEDICINE

## 2019-01-21 PROCEDURE — 99024 POSTOP FOLLOW-UP VISIT: CPT | Performed by: THORACIC SURGERY (CARDIOTHORACIC VASCULAR SURGERY)

## 2019-01-21 RX ORDER — SODIUM CHLORIDE 0.9 % (FLUSH) 0.9 %
3-10 SYRINGE (ML) INJECTION EVERY 8 HOURS
Status: DISCONTINUED | OUTPATIENT
Start: 2019-01-21 | End: 2019-01-23 | Stop reason: HOSPADM

## 2019-01-21 RX ORDER — SODIUM CHLORIDE 0.9 % (FLUSH) 0.9 %
3 SYRINGE (ML) INJECTION EVERY 12 HOURS SCHEDULED
Status: DISCONTINUED | OUTPATIENT
Start: 2019-01-21 | End: 2019-01-23 | Stop reason: HOSPADM

## 2019-01-21 RX ORDER — MORPHINE SULFATE 2 MG/ML
2 INJECTION, SOLUTION INTRAMUSCULAR; INTRAVENOUS
Status: DISCONTINUED | OUTPATIENT
Start: 2019-01-21 | End: 2019-01-23 | Stop reason: HOSPADM

## 2019-01-21 RX ADMIN — POTASSIUM CHLORIDE 40 MEQ: 750 CAPSULE, EXTENDED RELEASE ORAL at 08:16

## 2019-01-21 RX ADMIN — METOPROLOL TARTRATE 12.5 MG: 25 TABLET, FILM COATED ORAL at 08:17

## 2019-01-21 RX ADMIN — METOPROLOL TARTRATE 12.5 MG: 25 TABLET, FILM COATED ORAL at 20:43

## 2019-01-21 RX ADMIN — THYROID, PORCINE 45 MG: 30 TABLET ORAL at 07:03

## 2019-01-21 RX ADMIN — FAMOTIDINE 20 MG: 20 TABLET ORAL at 08:16

## 2019-01-21 RX ADMIN — SODIUM CHLORIDE, PRESERVATIVE FREE 3 ML: 5 INJECTION INTRAVENOUS at 20:42

## 2019-01-21 RX ADMIN — SENNOSIDES AND DOCUSATE SODIUM 2 TABLET: 8.6; 5 TABLET ORAL at 20:43

## 2019-01-21 RX ADMIN — FAMOTIDINE 20 MG: 20 TABLET ORAL at 20:43

## 2019-01-21 RX ADMIN — ATORVASTATIN CALCIUM 40 MG: 40 TABLET, FILM COATED ORAL at 20:43

## 2019-01-21 RX ADMIN — ASPIRIN 325 MG: 325 TABLET, DELAYED RELEASE ORAL at 08:16

## 2019-01-21 NOTE — PLAN OF CARE
Problem: Patient Care Overview  Goal: Plan of Care Review  Outcome: Ongoing (interventions implemented as appropriate)   01/20/19 1858   Coping/Psychosocial   Plan of Care Reviewed With patient   Plan of Care Review   Progress improving   OTHER   Outcome Summary Patient able to ambulate 400 ft x2. Able to drink boost for lunch, had large BM after ducolax suppository. Reports of no pain.       Problem: Cardiac Surgery (Adult)  Goal: Signs and Symptoms of Listed Potential Problems Will be Absent, Minimized or Managed (Cardiac Surgery)  Outcome: Ongoing (interventions implemented as appropriate)   01/20/19 1858   Goal/Outcome Evaluation   Problems Assessed (Cardiac Surgery) all   Problems Present (Cardiac Surgery) pain;situational response       Problem: Fall Risk (Adult)  Goal: Identify Related Risk Factors and Signs and Symptoms  Outcome: Outcome(s) achieved Date Met: 01/20/19    Goal: Absence of Fall  Outcome: Ongoing (interventions implemented as appropriate)   01/20/19 1858   Fall Risk (Adult)   Absence of Fall making progress toward outcome       Problem: Skin Injury Risk (Adult)  Goal: Identify Related Risk Factors and Signs and Symptoms  Outcome: Outcome(s) achieved Date Met: 01/20/19    Goal: Skin Health and Integrity  Outcome: Ongoing (interventions implemented as appropriate)   01/20/19 1858   Skin Injury Risk (Adult)   Skin Health and Integrity making progress toward outcome

## 2019-01-21 NOTE — PROGRESS NOTES
[facility]       LOS: 4 days   Patient Care Team:  Mario Bashir MD as PCP - General (Internal Medicine)        Subjective       No chest pain shortness breath palpitations or dizziness.  Feel good  Vital Signs  Temp:  [97.7 °F (36.5 °C)-98.9 °F (37.2 °C)] 98.9 °F (37.2 °C)  Heart Rate:  [72-91] 80  Resp:  [16-20] 20  BP: ()/(44-77) 120/63    Intake/Output Summary (Last 24 hours) at 1/21/2019 0948  Last data filed at 1/21/2019 0900  Gross per 24 hour   Intake 1127 ml   Output 920 ml   Net 207 ml     I/O this shift:  In: 240 [P.O.:240]  Out: -     Physical Exam:     General Appearance:    A&O, in no acute distress       Neck:   No adenopathy, supple, trachea midline, no thyromegaly, no JVD       Lungs:     Clear to auscultation anteriorly,respirations regular, even and                  unlabored    Heart:    Regular rhythm and normal rate, normal S1 and S2, no            murmur, no gallop, no rub, no click   Chest Wall:    No abnormalities observed   Abdomen:     Normal bowel sounds, no masses, no organomegaly, soft               Extremities:   No edema, no cyanosis, no redness   Pulses:   Pulses palpable and equal bilaterally     Results Review:     I reviewed the patient's new clinical results.      WBC WBC   Date/Time Value Ref Range Status   01/21/2019 0331 7.61 3.50 - 10.80 10*3/mm3 Final   01/20/2019 0349 8.57 3.50 - 10.80 10*3/mm3 Final   01/19/2019 0405 11.04 (H) 3.50 - 10.80 10*3/mm3 Final            HGB Hemoglobin   Date/Time Value Ref Range Status   01/21/2019 0331 8.7 (L) 13.1 - 17.5 g/dL Final   01/20/2019 0349 8.6 (L) 13.1 - 17.5 g/dL Final   01/19/2019 0405 8.9 (L) 13.1 - 17.5 g/dL Final   01/18/2019 2149 9.3 (L) 13.1 - 17.5 g/dL Final           HCT Hematocrit   Date/Time Value Ref Range Status   01/21/2019 0331 25.2 (L) 38.9 - 50.9 % Final   01/20/2019 0349 25.4 (L) 38.9 - 50.9 % Final   01/19/2019 0405 26.5 (L) 38.9 - 50.9  % Final   01/18/2019 2149 27.3 (L) 38.9 - 50.9 % Final            Platlets Platelets   Date/Time Value Ref Range Status   01/21/2019 0331 185 150 - 450 10*3/mm3 Final   01/20/2019 0349 152 150 - 450 10*3/mm3 Final   01/19/2019 0405 152 150 - 450 10*3/mm3 Final     Sodium  Sodium   Date/Time Value Ref Range Status   01/21/2019 0331 139 132 - 146 mmol/L Final   01/20/2019 0349 140 132 - 146 mmol/L Final   01/19/2019 0405 139 132 - 146 mmol/L Final     Potassium  Potassium   Date/Time Value Ref Range Status   01/21/2019 0331 3.5 3.5 - 5.5 mmol/L Final   01/20/2019 0349 3.9 3.5 - 5.5 mmol/L Final   01/19/2019 0405 4.0 3.5 - 5.5 mmol/L Final     Chloride  Chloride   Date/Time Value Ref Range Status   01/21/2019 0331 108 99 - 109 mmol/L Final   01/20/2019 0349 110 (H) 99 - 109 mmol/L Final   01/19/2019 0405 109 99 - 109 mmol/L Final     BicarbonateNo results found for: PLASMABICARB    BUN BUN   Date/Time Value Ref Range Status   01/21/2019 0331 26 (H) 9 - 23 mg/dL Final   01/20/2019 0349 28 (H) 9 - 23 mg/dL Final   01/19/2019 0405 25 (H) 9 - 23 mg/dL Final      Creatinine Creatinine   Date/Time Value Ref Range Status   01/21/2019 0331 0.75 0.60 - 1.30 mg/dL Final   01/20/2019 0349 0.91 0.60 - 1.30 mg/dL Final   01/19/2019 0405 1.25 0.60 - 1.30 mg/dL Final      Calcium Calcium   Date/Time Value Ref Range Status   01/21/2019 0331 8.4 (L) 8.7 - 10.4 mg/dL Final   01/20/2019 0349 8.3 (L) 8.7 - 10.4 mg/dL Final   01/19/2019 0405 8.3 (L) 8.7 - 10.4 mg/dL Final      Mag @RESULFAST(MG:3)@        PT/INR:       Lab Results   Component Value Date    PROTIME 16.0 (H) 01/18/2019    PROTIME 15.3 (H) 01/17/2019    PROTIME 13.2 01/17/2019    INR 1.34 (H) 01/18/2019    INR 1.27 (H) 01/17/2019    INR 1.05 01/17/2019      Troponin I:  No results found for: TROPONINI No results found for: CKTOTAL, CKMB, CKMBINDEX, POCRTROPI, TROPONINT      aspirin 325 mg Oral Daily   atorvastatin 40 mg Oral Nightly   famotidine 20 mg Oral BID   metoprolol  tartrate 12.5 mg Oral Q12H   pharmacy consult - MTM  Does not apply Daily   sennosides-docusate sodium 2 tablet Oral BID   Thyroid 45 mg Oral Q AM          Assessment/Plan     Patient Active Problem List   Diagnosis Code   • Unstable angina (CMS/[facility]) I20.0   • Severe CAD with occluded RCA and 90% left main I25.10   • S/P CABG x 4 on 1/17/19 Z95.1   • Hypertension I10   • Hyperlipidemia E78.5   • Hypothyroidism on replacement E07.9     Progressing after CABG   Renal function is improved   Ambulate      01/21/19  9:48 AM

## 2019-01-21 NOTE — PROGRESS NOTES
[facility] Medicine Services  PROGRESS NOTE    Patient Name: Yosef Hardy  : 1945  MRN: [id number]    Date of Admission: 2019  Length of Stay: 4  Primary Care Physician: Mario Bashir MD    Subjective   Subjective     CC:  Consult for med mgmt    HPI:  Pt doing well this afternoon, transferred to telemetry floor from ICU this am.  Denies any complaints.    Review of Systems  Gen- No fevers, chills  CV- No chest pain, palpitations  Resp- No cough, dyspnea  GI- No N/V/D, abd pain  Otherwise ROS is negative except as mentioned in the HPI.    Objective   Objective     Vital Signs:   Temp:  [97.9 °F (36.6 °C)-98.9 °F (37.2 °C)] 98.9 °F (37.2 °C)  Heart Rate:  [72-91] 79  Resp:  [16-20] 20  BP: ()/(44-77) 120/63        Physical Exam:  Constitutional: No acute distress, awake, alert  HENT: NCAT, mucous membranes moist  Respiratory: Clear to auscultation bilaterally, respiratory effort normal   Cardiovascular: RRR, no murmurs, rubs, or gallops, palpable pedal pulses bilaterally  Gastrointestinal: Positive bowel sounds, soft, nontender, nondistended  Musculoskeletal: No bilateral ankle edema  Psychiatric: Appropriate affect, cooperative  Neurologic: Oriented x 3, strength symmetric in all extremities, Cranial Nerves grossly intact to confrontation, speech clear  Skin: sternotomy wound well healing, inferior wound dressing c/d/i    Results Reviewed:  I have personally reviewed current lab, radiology, and data and agree.    Results from last 7 days   Lab Units 19  0331 19  0349 19  0405  19  0352  19  1146  19  0639   WBC 10*3/mm3 7.61 8.57 11.04*  --  12.15*   < > 9.37  --   --    HEMOGLOBIN g/dL 8.7* 8.6* 8.9*   < > 9.0*   < > 9.4*  --   --    HEMOGLOBIN, POC   --   --   --   --   --   --   --    < >  --    HEMATOCRIT % 25.2* 25.4* 26.5*   < > 27.1*   < > 27.2*  --   --    HEMATOCRIT POC   --   --   --   --   --   --   --    < >  --     PLATELETS 10*3/mm3 185 152 152  --  194   < > 151  --   --    INR   --   --   --   --  1.34*  --  1.27*  --  1.05    < > = values in this interval not displayed.     Results from last 7 days   Lab Units 01/21/19  0331 01/20/19  0349 01/19/19  0405   SODIUM mmol/L 139 140 139   POTASSIUM mmol/L 3.5 3.9 4.0   CHLORIDE mmol/L 108 110* 109   CO2 mmol/L 25.0 25.0 22.0   BUN mg/dL 26* 28* 25*   CREATININE mg/dL 0.75 0.91 1.25   GLUCOSE mg/dL 92 95 122*   CALCIUM mg/dL 8.4* 8.3* 8.3*     Estimated Creatinine Clearance: 83.9 mL/min (by C-G formula based on SCr of 0.75 mg/dL).    No results found for: BNP    Microbiology Results Abnormal     None          Imaging Results (last 24 hours)     Procedure Component Value Units Date/Time    XR Chest PA & Lateral [[id number]] Collected:  01/20/19 1752     Updated:  01/21/19 0853    Narrative:          EXAMINATION: XR PA AND LATERAL CHEST - 1/20/2019     INDICATION:  Z74.09-Other reduced mobility; I20.0-Unstable angina.      COMPARISON: 1/19/2019     FINDINGS: Support hardware demonstrates removal of mediastinal and  pleural drains with right internal jugular sheath remaining in place. No  pneumothorax. Persistent blunting the left lateral costophrenic sulcus  may represent trace effusion and/or atelectasis similar to prior. No  significant interval change in convex margin along the left superior  mediastinum from prior comparison.           Impression:       No significant change in overall appearance of convex margin  left superior mediastinal contour from prior comparison. Interval  removal of mediastinal and pleural drains with persistent opacifications  at the left lung base; however, no new parenchymal process.     DICTATED:   1/20/2019  EDITED/ls :   1/20/2019      This report was finalized on 1/21/2019 8:50 AM by Dr. Gus Griffith.             Results for orders placed during the hospital encounter of 01/16/19   Adult Transthoracic Echo Complete W/ Cont if Necessary Per  Protocol    Narrative · Mild tricuspid valve regurgitation is present.  · Calculated right ventricular systolic pressure from tricuspid   regurgitation is 30 mmHg.  · Estimated EF appears to be in the range of 61 - 65%.  · Left ventricular systolic function is normal.  · Left ventricular diastolic dysfunction (grade I) consistent with   impaired relaxation.  · There is moderate calcification of the aortic valve mainly affecting the   non and right coronary cusp(s).  · Mild pulmonic valve regurgitation is present.          I have reviewed the medications:    Current Facility-Administered Medications:   •  acetaminophen (TYLENOL) tablet 650 mg, 650 mg, Oral, Q4H PRN, Gato Coppola MD, 650 mg at 01/19/19 1342  •  aspirin EC tablet 325 mg, 325 mg, Oral, Daily, Bon Rahman PA, 325 mg at 01/21/19 0816  •  atorvastatin (LIPITOR) tablet 40 mg, 40 mg, Oral, Nightly, Theo Piedra MD, 40 mg at 01/20/19 2214  •  bisacodyl (DULCOLAX) EC tablet 10 mg, 10 mg, Oral, Daily PRN, Bon Rahman PA  •  docusate sodium (COLACE) capsule 100 mg, 100 mg, Oral, BID PRN, Bon Rahman PA  •  famotidine (PEPCID) tablet 20 mg, 20 mg, Oral, BID, Yosef Donahue MD, 20 mg at 01/21/19 0816  •  HYDROcodone-acetaminophen (NORCO) 7.5-325 MG per tablet 1 tablet, 1 tablet, Oral, Q4H PRN, Gato Coppola MD, 1 tablet at 01/18/19 1433  •  metoprolol tartrate (LOPRESSOR) half tablet 12.5 mg, 12.5 mg, Oral, Q12H, Bon Rahman PA, 12.5 mg at 01/21/19 0817  •  Morphine sulfate (PF) injection 2 mg, 2 mg, Intravenous, Q2H PRN, Gato Coppola MD  •  ondansetron (ZOFRAN) injection 4 mg, 4 mg, Intravenous, Q6H PRN, Bon Rahman PA, 4 mg at 01/20/19 1204  •  oxyCODONE-acetaminophen (PERCOCET) 5-325 MG per tablet 2 tablet, 2 tablet, Oral, Q4H PRN, Gato Coppola MD, 1 tablet at 01/18/19 2225  •  Pharmacy Consult - MTM, , Does not apply, Daily, Theo Barnes IV, [facility], Stopped at 01/17/19 1241  •   sennosides-docusate sodium (SENOKOT-S) 8.6-50 MG tablet 2 tablet, 2 tablet, Oral, BID, Bon Rahman PA, 2 tablet at 01/20/19 0806  •  sodium chloride 0.9 % flush 3 mL, 3 mL, Intravenous, Q12H, Bernabe Khan PA  •  sodium chloride 0.9 % flush 3-10 mL, 3-10 mL, Intravenous, Q8H, Bernabe Khan PA  •  Thyroid (ARMOUR) tablet 45 mg, 45 mg, Oral, Q AM, Yosef Donahue MD, 45 mg at 01/21/19 0703      Assessment/Plan   Assessment / Plan     Active Hospital Problems    Diagnosis Date Noted   • **Severe CAD with occluded RCA and 90% left main [I25.10] 01/17/2019   • S/P CABG x 4 on 1/17/19 [Z95.1] 01/17/2019   • Hypertension [I10] 01/17/2019   • Hyperlipidemia [E78.5] 01/17/2019   • Hypothyroidism on replacement [E07.9] 01/17/2019   • Unstable angina (CMS/HCC) [I20.0] 01/10/2019     Added automatically from request for surgery [id number]            Brief Hospital Course to date:  Yosef Hardy is a 73 y.o. male with PMH of HTN, HLD, Hypothyroidism who had a several month history of exercise induced chest discomfort and underwent stress echo- this was abnormal and pt was referred to Dr. Piedra for LHC on 1/16/19. Pt was found to have occluded RCA and 90% left main, he underwent uneventful CABG x 4 on 1/17.  He was transferred out of the ICU on 1/21 to telemetry and we were consulted for medical management.      Plan:  --CAD- s/p CABG x 4 on 1/17/19. Post-op plan per CTS.  --HTN- continue home meds  --HLD- continue statin  --Hypohtyroidism- continue armour thyroid  --Anemia- monitor HgB, transfuse PRN HgB<7    DVT Prophylaxis:  mechanical    Disposition: I expect the patient to be discharged TBD    CODE STATUS:   Code Status and Medical Interventions:   Ordered at: 01/17/19 1135     Code Status:    CPR     Medical Interventions (Level of Support Prior to Arrest):    Full         Electronically signed by Rayna Cruz MD, 01/21/19, 1:41 PM.

## 2019-01-21 NOTE — NURSING NOTE
Prior to central line removal, order for the removal of catheter was verified, patient was assessed, necessary materials were gathered and patient was educated regarding procedure .    Patient was positioned supine and flat to ensure that the insertion site was at or below the level of the heart.    Hands were washed, clean gloves were applied and central line dressing was gently removed. Catheter exit site was not cultured.     A new pair of clean gloves were then applied. Insertion site was cleansed with 2% Chlorhexidine swab using a circular motion beginning at the insertion site and moving outward for 30 seconds and allowed to dry.     Clamp on line was present and clamped.     Patient was instructed to hold breath as catheter was withdrawn.     The central line was grasped at the insertion site and slowly pulled outward parallel to the skin. Resistance was not met.    After central line was completely removed, a sterile 4x4 gauze pad was used to apply light pressure until bleeding stopped. At that time, petroleum-based gauze and a sterile occlusive dressing was applied to exit site.     Patient was instructed to keep dressing in place for at least 24 hours and to remain in a flat or reclining position for 30 minutes post-removal.     Catheter was inspected after removal and was intact. Tip of central line was not sent for culture. Patient tolerated procedure.

## 2019-01-21 NOTE — PROGRESS NOTES
CTS Progress Note       LOS: 4 days   Patient Care Team:  Mario Bashir MD as PCP - General (Internal Medicine)    Chief Complaint: Coronary artery disease    Vital Signs:  Temp:  [97.7 °F (36.5 °C)-97.9 °F (36.6 °C)] 97.9 °F (36.6 °C)  Heart Rate:  [70-91] 77  Resp:  [16-18] 16  BP: ()/(44-77) 101/44    Physical Exam: Sternum is stable sternal incision is satisfactory breathing unlabored no neurologic deficits.  Pulses symmetric   Results:   Results from last 7 days   Lab Units 01/21/19  0331   WBC 10*3/mm3 7.61   HEMOGLOBIN g/dL 8.7*   HEMATOCRIT % 25.2*   PLATELETS 10*3/mm3 185     Results from last 7 days   Lab Units 01/21/19  0331   SODIUM mmol/L 139   POTASSIUM mmol/L 3.5   CHLORIDE mmol/L 108   CO2 mmol/L 25.0   BUN mg/dL 26*   CREATININE mg/dL 0.75   GLUCOSE mg/dL 92   CALCIUM mg/dL 8.4*           Imaging Results (last 24 hours)     Procedure Component Value Units Date/Time    XR Chest PA & Lateral [[id number]] Collected:  01/20/19 1752     Updated:  01/20/19 1802    Narrative:          EXAMINATION: XR PA AND LATERAL CHEST - 1/20/2019     INDICATION:  Z74.09-Other reduced mobility; I20.0-Unstable angina.      COMPARISON: 1/19/2019     FINDINGS: Support hardware demonstrates removal of mediastinal and  pleural drains with right internal jugular sheath remaining in place. No  pneumothorax. Persistent blunting the left lateral costophrenic sulcus  may represent trace effusion and/or atelectasis similar to prior. No  significant interval change in convex margin along the left superior  mediastinum from prior comparison.           Impression:       No significant change in overall appearance of convex margin  left superior mediastinal contour from prior comparison. Interval  removal of mediastinal and pleural drains with persistent opacifications  at the left lung base; however, no new parenchymal process.     DICTATED:   1/20/2019  EDITED/ls :   1/20/2019              Assessment      Severe CAD with  occluded RCA and 90% left main    Unstable angina (CMS/HCC)    S/P CABG x 4 on 1/17/19    Hypertension    Hyperlipidemia    Hypothyroidism on replacement        Plan   Transfer to telemetry    Please note that portions of this note were completed with a voice recognition program. Efforts were made to edit the dictations, but occasionally words are mistranscribed.    Gato Coppola MD  01/21/19  6:30 AM

## 2019-01-21 NOTE — PLAN OF CARE
Problem: Patient Care Overview  Goal: Plan of Care Review  Outcome: Ongoing (interventions implemented as appropriate)   01/21/19 0600   Coping/Psychosocial   Plan of Care Reviewed With patient   Plan of Care Review   Progress improving   OTHER   Outcome Summary this shift. Dr. Coppola here this AM and pt to transfer today. No c/o pain       Problem: Cardiac Surgery (Adult)  Goal: Signs and Symptoms of Listed Potential Problems Will be Absent, Minimized or Managed (Cardiac Surgery)  Outcome: Ongoing (interventions implemented as appropriate)   01/21/19 0600   Goal/Outcome Evaluation   Problems Assessed (Cardiac Surgery) all   Problems Present (Cardiac Surgery) situational response       Problem: Fall Risk (Adult)  Goal: Absence of Fall  Outcome: Ongoing (interventions implemented as appropriate)   01/21/19 0600   Fall Risk (Adult)   Absence of Fall making progress toward outcome       Problem: Skin Injury Risk (Adult)  Goal: Skin Health and Integrity  Outcome: Outcome(s) achieved Date Met: 01/21/19 01/21/19 0600   Skin Injury Risk (Adult)   Skin Health and Integrity making progress toward outcome

## 2019-01-21 NOTE — PROGRESS NOTES
Continued Stay Note   [place]     Patient Name: Yosef Hardy  MRN: [id number]  Today's Date: 1/21/2019    Admit Date: 1/16/2019    Discharge Plan     Row Name 01/21/19 1057       Plan    Plan  Home with spouse    Patient/Family in Agreement with Plan  yes    Plan Comments  Met with patient at bedside to update discharge plan. He is currently on room air. Walked 400' with PT yesterday. He anticipates discharge home on Wednesday. States his spouse will be able to assist him as needed at home. Denies any discharge planning needs or concerns at this time. CM will continue to follow. Cora Cruz RN [phone]    Final Discharge Disposition Code  01 - home or self-care        Discharge Codes    No documentation.       Expected Discharge Date and Time     Expected Discharge Date Expected Discharge Time    Jan 23, 2019             Cora Cruz RN

## 2019-01-22 LAB
ANION GAP SERPL CALCULATED.3IONS-SCNC: 4 MMOL/L (ref 3–11)
BUN BLD-MCNC: 17 MG/DL (ref 9–23)
BUN/CREAT SERPL: 21.5 (ref 7–25)
CALCIUM SPEC-SCNC: 8.7 MG/DL (ref 8.7–10.4)
CHLORIDE SERPL-SCNC: 110 MMOL/L (ref 99–109)
CO2 SERPL-SCNC: 24 MMOL/L (ref 20–31)
CREAT BLD-MCNC: 0.79 MG/DL (ref 0.6–1.3)
GFR SERPL CREATININE-BSD FRML MDRD: 96 ML/MIN/1.73
GLUCOSE BLD-MCNC: 88 MG/DL (ref 70–100)
HCT VFR BLD AUTO: 26.7 % (ref 38.9–50.9)
HGB BLD-MCNC: 9.1 G/DL (ref 13.1–17.5)
POTASSIUM BLD-SCNC: 3.6 MMOL/L (ref 3.5–5.5)
SODIUM BLD-SCNC: 138 MMOL/L (ref 132–146)

## 2019-01-22 PROCEDURE — 25010000002 FUROSEMIDE PER 20 MG: Performed by: PHYSICIAN ASSISTANT

## 2019-01-22 PROCEDURE — 97110 THERAPEUTIC EXERCISES: CPT

## 2019-01-22 PROCEDURE — 97116 GAIT TRAINING THERAPY: CPT

## 2019-01-22 PROCEDURE — 99024 POSTOP FOLLOW-UP VISIT: CPT | Performed by: THORACIC SURGERY (CARDIOTHORACIC VASCULAR SURGERY)

## 2019-01-22 PROCEDURE — 99231 SBSQ HOSP IP/OBS SF/LOW 25: CPT | Performed by: INTERNAL MEDICINE

## 2019-01-22 PROCEDURE — 85014 HEMATOCRIT: CPT | Performed by: PHYSICIAN ASSISTANT

## 2019-01-22 PROCEDURE — 80048 BASIC METABOLIC PNL TOTAL CA: CPT | Performed by: PHYSICIAN ASSISTANT

## 2019-01-22 PROCEDURE — 85018 HEMOGLOBIN: CPT | Performed by: PHYSICIAN ASSISTANT

## 2019-01-22 RX ORDER — POTASSIUM CHLORIDE 1.5 G/1.77G
20 POWDER, FOR SOLUTION ORAL ONCE
Status: COMPLETED | OUTPATIENT
Start: 2019-01-22 | End: 2019-01-22

## 2019-01-22 RX ORDER — FUROSEMIDE 10 MG/ML
40 INJECTION INTRAMUSCULAR; INTRAVENOUS ONCE
Status: COMPLETED | OUTPATIENT
Start: 2019-01-22 | End: 2019-01-22

## 2019-01-22 RX ADMIN — SODIUM CHLORIDE, PRESERVATIVE FREE 3 ML: 5 INJECTION INTRAVENOUS at 20:54

## 2019-01-22 RX ADMIN — SENNOSIDES AND DOCUSATE SODIUM 2 TABLET: 8.6; 5 TABLET ORAL at 09:42

## 2019-01-22 RX ADMIN — POTASSIUM CHLORIDE 20 MEQ: 1.5 POWDER, FOR SOLUTION ORAL at 09:42

## 2019-01-22 RX ADMIN — FAMOTIDINE 20 MG: 20 TABLET ORAL at 09:43

## 2019-01-22 RX ADMIN — ATORVASTATIN CALCIUM 40 MG: 40 TABLET, FILM COATED ORAL at 20:51

## 2019-01-22 RX ADMIN — SODIUM CHLORIDE, PRESERVATIVE FREE 3 ML: 5 INJECTION INTRAVENOUS at 09:44

## 2019-01-22 RX ADMIN — SODIUM CHLORIDE, PRESERVATIVE FREE 10 ML: 5 INJECTION INTRAVENOUS at 12:34

## 2019-01-22 RX ADMIN — THYROID, PORCINE 45 MG: 30 TABLET ORAL at 09:43

## 2019-01-22 RX ADMIN — ASPIRIN 325 MG: 325 TABLET, DELAYED RELEASE ORAL at 09:43

## 2019-01-22 RX ADMIN — METOPROLOL TARTRATE 12.5 MG: 25 TABLET, FILM COATED ORAL at 20:51

## 2019-01-22 RX ADMIN — FUROSEMIDE 40 MG: 10 INJECTION, SOLUTION INTRAMUSCULAR; INTRAVENOUS at 09:42

## 2019-01-22 RX ADMIN — FAMOTIDINE 20 MG: 20 TABLET ORAL at 20:50

## 2019-01-22 RX ADMIN — METOPROLOL TARTRATE 12.5 MG: 25 TABLET, FILM COATED ORAL at 09:43

## 2019-01-22 NOTE — PROGRESS NOTES
[facility]       LOS: 5 days   Patient Care Team:  Mario Bashir MD as PCP - General (Internal Medicine)        Subjective       No chest pain shortness breath palpitations or dizziness.  Feel good  Vital Signs  Temp:  [98 °F (36.7 °C)-98.4 °F (36.9 °C)] 98.4 °F (36.9 °C)  Heart Rate:  [78-84] 78  Resp:  [16-18] 16  BP: (120-149)/(60-71) 121/60    Intake/Output Summary (Last 24 hours) at 1/22/2019 0838  Last data filed at 1/22/2019 0606  Gross per 24 hour   Intake 360 ml   Output 750 ml   Net -390 ml     No intake/output data recorded.    Physical Exam:     General Appearance:    A&O, in no acute distress       Neck:   No adenopathy, supple, trachea midline, no thyromegaly, no JVD       Lungs:     Fine crackles at bases to auscultation anteriorly,respirations regular, even and unlabored    Heart:    Regular rhythm and normal rate, normal S1 and S2, no            murmur, no gallop, no rub, no click   Chest Wall:    No abnormalities observed   Abdomen:     Normal bowel sounds, no masses, no organomegaly, soft               Extremities:   No edema, no cyanosis, no redness   Pulses:   Pulses palpable and equal bilaterally     Results Review:     I reviewed the patient's new clinical results.      WBC WBC   Date/Time Value Ref Range Status   01/21/2019 0331 7.61 3.50 - 10.80 10*3/mm3 Final   01/20/2019 0349 8.57 3.50 - 10.80 10*3/mm3 Final            HGB Hemoglobin   Date/Time Value Ref Range Status   01/22/2019 0416 9.1 (L) 13.1 - 17.5 g/dL Final   01/21/2019 0331 8.7 (L) 13.1 - 17.5 g/dL Final   01/20/2019 0349 8.6 (L) 13.1 - 17.5 g/dL Final           HCT Hematocrit   Date/Time Value Ref Range Status   01/22/2019 0416 26.7 (L) 38.9 - 50.9 % Final   01/21/2019 0331 25.2 (L) 38.9 - 50.9 % Final   01/20/2019 0349 25.4 (L) 38.9 - 50.9 % Final            Platlets Platelets   Date/Time Value Ref Range Status   01/21/2019 0331 185 150 - 450 10*3/mm3 Final    01/20/2019 0349 152 150 - 450 10*3/mm3 Final     Sodium  Sodium   Date/Time Value Ref Range Status   01/22/2019 0416 138 132 - 146 mmol/L Final   01/21/2019 0331 139 132 - 146 mmol/L Final   01/20/2019 0349 140 132 - 146 mmol/L Final     Potassium  Potassium   Date/Time Value Ref Range Status   01/22/2019 0416 3.6 3.5 - 5.5 mmol/L Final   01/21/2019 0331 3.5 3.5 - 5.5 mmol/L Final   01/20/2019 0349 3.9 3.5 - 5.5 mmol/L Final     Chloride  Chloride   Date/Time Value Ref Range Status   01/22/2019 0416 110 (H) 99 - 109 mmol/L Final   01/21/2019 0331 108 99 - 109 mmol/L Final   01/20/2019 0349 110 (H) 99 - 109 mmol/L Final     BicarbonateNo results found for: PLASMABICARB    BUN BUN   Date/Time Value Ref Range Status   01/22/2019 0416 17 9 - 23 mg/dL Final   01/21/2019 0331 26 (H) 9 - 23 mg/dL Final   01/20/2019 0349 28 (H) 9 - 23 mg/dL Final      Creatinine Creatinine   Date/Time Value Ref Range Status   01/22/2019 0416 0.79 0.60 - 1.30 mg/dL Final   01/21/2019 0331 0.75 0.60 - 1.30 mg/dL Final   01/20/2019 0349 0.91 0.60 - 1.30 mg/dL Final      Calcium Calcium   Date/Time Value Ref Range Status   01/22/2019 0416 8.7 8.7 - 10.4 mg/dL Final   01/21/2019 0331 8.4 (L) 8.7 - 10.4 mg/dL Final   01/20/2019 0349 8.3 (L) 8.7 - 10.4 mg/dL Final      Mag @RESULFAST(MG:3)@        PT/INR:       Lab Results   Component Value Date    PROTIME 16.0 (H) 01/18/2019    PROTIME 15.3 (H) 01/17/2019    PROTIME 13.2 01/17/2019    INR 1.34 (H) 01/18/2019    INR 1.27 (H) 01/17/2019    INR 1.05 01/17/2019      Troponin I:  No results found for: TROPONINI No results found for: CKTOTAL, CKMB, CKMBINDEX, POCRTROPI, TROPONINT      aspirin 325 mg Oral Daily   atorvastatin 40 mg Oral Nightly   famotidine 20 mg Oral BID   furosemide 40 mg Intravenous Once   metoprolol tartrate 12.5 mg Oral Q12H   pharmacy consult - MTM  Does not apply Daily   potassium chloride 20 mEq Oral Once   sennosides-docusate sodium 2 tablet Oral BID   sodium chloride 3 mL  Intravenous Q12H   sodium chloride 3-10 mL Intravenous Q8H   Thyroid 45 mg Oral Q AM          Assessment/Plan     Patient Active Problem List   Diagnosis Code   • Unstable angina (CMS/HCC) I20.0   • Severe CAD with occluded RCA and 90% left main I25.10   • S/P CABG x 4 on 1/17/19 Z95.1   • Hypertension I10   • Hyperlipidemia E78.5   • Hypothyroidism on replacement E07.9     Progressing after CABG   Renal function is improved   Ambulate   Lasix x 1  Home in am     01/22/19  8:38 AM

## 2019-01-22 NOTE — PROGRESS NOTES
[facility] Medicine Services  PROGRESS NOTE    Patient Name: Yosef Hardy  : 1945  MRN: [id number]    Date of Admission: 2019  Length of Stay: 5  Primary Care Physician: Mario Bashir MD    Subjective   Subjective     CC:  Consult for med mgmt    HPI:  Pt doing well this afternoon, transferred to telemetry floor from ICU this am.  Denies any complaints.    Review of Systems  Gen- No fevers, chills  CV- No chest pain, palpitations  Resp- No cough, dyspnea  GI- No N/V/D, abd pain  Otherwise ROS is negative except as mentioned in the HPI.    Objective   Objective     Vital Signs:   Temp:  [98 °F (36.7 °C)-98.4 °F (36.9 °C)] 98.4 °F (36.9 °C)  Heart Rate:  [78-93] 83  Resp:  [16] 16  BP: (121-149)/(54-71) 129/54        Physical Exam:  Constitutional: No acute distress, awake, alert  HENT: NCAT, mucous membranes moist  Respiratory: Clear to auscultation bilaterally, respiratory effort normal   Cardiovascular: RRR, no murmurs, rubs, or gallops, palpable pedal pulses bilaterally  Gastrointestinal: Positive bowel sounds, soft, nontender, nondistended  Musculoskeletal: No bilateral ankle edema  Psychiatric: Appropriate affect, cooperative  Neurologic: Oriented x 3, strength symmetric in all extremities, Cranial Nerves grossly intact to confrontation, speech clear  Skin: sternotomy wound well healing, inferior wound dressing c/d/i    Results Reviewed:  I have personally reviewed current lab, radiology, and data and agree.    Results from last 7 days   Lab Units 19  0416 19  0331 19  0349 19  0405  19  0352  19  1146  19  0639   WBC 10*3/mm3  --  7.61 8.57 11.04*  --  12.15*   < > 9.37  --   --    HEMOGLOBIN g/dL 9.1* 8.7* 8.6* 8.9*   < > 9.0*   < > 9.4*  --   --    HEMOGLOBIN, POC   --   --   --   --   --   --   --   --    < >  --    HEMATOCRIT % 26.7* 25.2* 25.4* 26.5*   < > 27.1*   < > 27.2*  --   --    HEMATOCRIT POC   --   --   --    --   --   --   --   --    < >  --    PLATELETS 10*3/mm3  --  185 152 152  --  194   < > 151  --   --    INR   --   --   --   --   --  1.34*  --  1.27*  --  1.05    < > = values in this interval not displayed.     Results from last 7 days   Lab Units 01/22/19  0416 01/21/19  0331 01/20/19  0349   SODIUM mmol/L 138 139 140   POTASSIUM mmol/L 3.6 3.5 3.9   CHLORIDE mmol/L 110* 108 110*   CO2 mmol/L 24.0 25.0 25.0   BUN mg/dL 17 26* 28*   CREATININE mg/dL 0.79 0.75 0.91   GLUCOSE mg/dL 88 92 95   CALCIUM mg/dL 8.7 8.4* 8.3*     Estimated Creatinine Clearance: 84.2 mL/min (by C-G formula based on SCr of 0.79 mg/dL).    No results found for: BNP    Microbiology Results Abnormal     None          Imaging Results (last 24 hours)     ** No results found for the last 24 hours. **          Results for orders placed during the hospital encounter of 01/16/19   Adult Transthoracic Echo Complete W/ Cont if Necessary Per Protocol    Narrative · Mild tricuspid valve regurgitation is present.  · Calculated right ventricular systolic pressure from tricuspid   regurgitation is 30 mmHg.  · Estimated EF appears to be in the range of 61 - 65%.  · Left ventricular systolic function is normal.  · Left ventricular diastolic dysfunction (grade I) consistent with   impaired relaxation.  · There is moderate calcification of the aortic valve mainly affecting the   non and right coronary cusp(s).  · Mild pulmonic valve regurgitation is present.          I have reviewed the medications:    Current Facility-Administered Medications:   •  acetaminophen (TYLENOL) tablet 650 mg, 650 mg, Oral, Q4H PRN, Gato Coppola MD, 650 mg at 01/19/19 1342  •  aspirin EC tablet 325 mg, 325 mg, Oral, Daily, Bon Rahman PA, 325 mg at 01/22/19 0943  •  atorvastatin (LIPITOR) tablet 40 mg, 40 mg, Oral, Nightly, Theo Piedra MD, 40 mg at 01/21/19 2043  •  bisacodyl (DULCOLAX) EC tablet 10 mg, 10 mg, Oral, Daily PRN, Bon Rahman PA  •  docusate  sodium (COLACE) capsule 100 mg, 100 mg, Oral, BID PRN, Bon Rahman PA  •  famotidine (PEPCID) tablet 20 mg, 20 mg, Oral, BID, Yosef Donahue MD, 20 mg at 01/22/19 0943  •  HYDROcodone-acetaminophen (NORCO) 7.5-325 MG per tablet 1 tablet, 1 tablet, Oral, Q4H PRN, Gato Coppola MD, 1 tablet at 01/18/19 1433  •  metoprolol tartrate (LOPRESSOR) half tablet 12.5 mg, 12.5 mg, Oral, Q12H, Bon Rahman PA, 12.5 mg at 01/22/19 0943  •  Morphine sulfate (PF) injection 2 mg, 2 mg, Intravenous, Q2H PRN, Gato Coppola MD  •  ondansetron (ZOFRAN) injection 4 mg, 4 mg, Intravenous, Q6H PRN, Bon Rahman PA, 4 mg at 01/20/19 1204  •  oxyCODONE-acetaminophen (PERCOCET) 5-325 MG per tablet 2 tablet, 2 tablet, Oral, Q4H PRN, Gato Coppola MD, 1 tablet at 01/18/19 2225  •  Pharmacy Consult - [facility], , Does not apply, Daily, Theo Barnes IV, [facility], Stopped at 01/17/19 1241  •  sennosides-docusate sodium (SENOKOT-S) 8.6-50 MG tablet 2 tablet, 2 tablet, Oral, BID, Bon Rahman PA, 2 tablet at 01/22/19 0942  •  sodium chloride 0.9 % flush 3 mL, 3 mL, Intravenous, Q12H, Bernabe Khan PA, 3 mL at 01/22/19 0944  •  sodium chloride 0.9 % flush 3-10 mL, 3-10 mL, Intravenous, Q8H, Bernabe Khan PA, 10 mL at 01/22/19 1234  •  Thyroid (ARMOUR) tablet 45 mg, 45 mg, Oral, Q AM, Yosef Donahue MD, 45 mg at 01/22/19 0943      Assessment/Plan   Assessment / Plan     Active Hospital Problems    Diagnosis Date Noted   • **Severe CAD with occluded RCA and 90% left main [I25.10] 01/17/2019   • S/P CABG x 4 on 1/17/19 [Z95.1] 01/17/2019   • Hypertension [I10] 01/17/2019   • Hyperlipidemia [E78.5] 01/17/2019   • Hypothyroidism on replacement [E07.9] 01/17/2019   • Unstable angina (CMS/HCC) [I20.0] 01/10/2019     Added automatically from request for surgery [id number]            Brief Hospital Course to date:  Yosef Hardy is a 73 y.o. male with PMH of HTN, HLD, Hypothyroidism who had a several month history  of exercise induced chest discomfort and underwent stress echo- this was abnormal and pt was referred to Dr. Piedra for LHC on 1/16/19. Pt was found to have occluded RCA and 90% left main, he underwent uneventful CABG x 4 on 1/17.  He was transferred out of the ICU on 1/21 to telemetry and we were consulted for medical management.      Plan:  -- SOB and swelling of hands, much improved with lasix.  --CAD- s/p CABG x 4 on 1/17/19. Post-op plan per CTS.  --HTN- continue home meds  --HLD- continue statin  --Hypohtyroidism- continue armour thyroid  --Anemia- monitor HgB, transfuse PRN HgB<7    PLAN:  Continue current care.    DVT Prophylaxis:  mechanical    Disposition: I expect the patient to be discharged TBD    CODE STATUS:   Code Status and Medical Interventions:   Ordered at: 01/17/19 1135     Code Status:    CPR     Medical Interventions (Level of Support Prior to Arrest):    Full         Electronically signed by Genaro Aviles MD, 01/22/19, 4:57 PM.

## 2019-01-22 NOTE — PLAN OF CARE
Problem: Patient Care Overview  Goal: Plan of Care Review  Outcome: Ongoing (interventions implemented as appropriate)   01/22/19 6471   Coping/Psychosocial   Plan of Care Reviewed With patient;spouse   Plan of Care Review   Progress improving   OTHER   Outcome Summary vital signs wnl. Oxygen level greater than 90% on room air. Pain controlled. No complaints of shortness of breath. Patient has walked twice today 350ft in hallway. Possibly going home tommorrow.

## 2019-01-22 NOTE — PLAN OF CARE
Problem: Cardiac Surgery (Adult)  Goal: Signs and Symptoms of Listed Potential Problems Will be Absent, Minimized or Managed (Cardiac Surgery)  Outcome: Ongoing (interventions implemented as appropriate)      Problem: Fall Risk (Adult)  Goal: Absence of Fall  Outcome: Ongoing (interventions implemented as appropriate)

## 2019-01-22 NOTE — PROGRESS NOTES
Pt. Referred for Phase II Cardiac Rehab. Staff discussed benefits of exercise, program protocol, and educational material provided. Teach back verified.  Permission granted from patient for staff to fax referral information to outlying program at this time.  Staff to fax referral info to  [facility].

## 2019-01-22 NOTE — PLAN OF CARE
Problem: Patient Care Overview  Goal: Plan of Care Review  Outcome: Ongoing (interventions implemented as appropriate)   01/22/19 1115   Coping/Psychosocial   Plan of Care Reviewed With patient;spouse   Plan of Care Review   Progress improving   OTHER   Outcome Summary Pt. demo improved safety and independence w/ mobility, w/ improved activity tolerance. Pt. performed sit to stand trasnfers w/ CGA/min A and ambulated 350 ft w/ HHA, VSS. Pt. negotiated 12 steps w/ HHA to simulate home entry, and participated in seated TherEx/HEP. Recommend cont IPPT per POC.      01/22/19 1115   Coping/Psychosocial   Plan of Care Reviewed With patient;spouse   Plan of Care Review   Progress improving   OTHER   Outcome Summary Pt. demo improved safety and independence w/ mobility, w/ improved activity tolerance. Pt. performed sit to stand transfers w/ CGA/min A and ambulated 350 ft w/ HHA, VSS. Pt. negotiated 12 steps w/ HHA to simulate home entry, and participated in seated TherEx/HEP. Recommend cont IPPT per POC.

## 2019-01-22 NOTE — THERAPY TREATMENT NOTE
Acute Care - Physical Therapy Treatment Note  [facility]     Patient Name: Yosef Hardy  : 1945  MRN: [id number]  Today's Date: 2019  Onset of Illness/Injury or Date of Surgery: 19  Date of Referral to PT: 19  Referring Physician: MD Coppola    Admit Date: 2019    Visit Dx:    ICD-10-CM ICD-9-CM   1. Impaired functional mobility, balance, gait, and endurance Z74.09 V49.89   2. Unstable angina (CMS/HCC) I20.0 411.1     Patient Active Problem List   Diagnosis   • Unstable angina (CMS/HCC)   • Severe CAD with occluded RCA and 90% left main   • S/P CABG x 4 on 19   • Hypertension   • Hyperlipidemia   • Hypothyroidism on replacement       Therapy Treatment    Rehabilitation Treatment Summary     Row Name 19 5030             Treatment Time/Intention    Discipline  physical therapist  -MB      Document Type  therapy note (daily note)  -MB      Subjective Information  complains of;weakness  -MB      Mode of Treatment  physical therapy  -MB      Patient/Family Observations  Pt. sitting UIC, on RA, telemetry, IV heplocked.  RN consent for PT.  -MB      Care Plan Review  care plan/treatment goals reviewed;risks/benefits reviewed;current/potential barriers reviewed;patient/other agree to care plan  -MB      Care Plan Review, Other Participant(s)  spouse  -MB      Therapy Frequency (PT Clinical Impression)  daily  -MB      Patient Effort  good  -MB      Comment  Pt. w/ bloody BM; RN notified.  -MB      Existing Precautions/Restrictions  cardiac;sternal  -MB      Recorded by [MB] Lynda Leiva, PT 19 1114      Row Name 19 0917             Vital Signs    Pre Systolic BP Rehab  129  -MB      Pre Treatment Diastolic BP  54  -MB      Post Systolic BP Rehab  163  -MB      Post Treatment Diastolic BP  74  -MB      Pretreatment Heart Rate (beats/min)  93  -MB      Posttreatment Heart Rate (beats/min)  99  -MB      Pre SpO2 (%)  94  -MB      O2 Delivery Pre Treatment  room air   -MB      O2 Delivery Intra Treatment  room air  -MB      Post SpO2 (%)  95  -MB      O2 Delivery Post Treatment  room air  -MB      Pre Patient Position  Sitting  -MB      Intra Patient Position  Standing  -MB      Post Patient Position  Sitting  -MB      Recorded by [MB] Lynda Leiva, PT 01/22/19 1114      Row Name 01/22/19 0930             Cognitive Assessment/Intervention- PT/OT    Affect/Mental Status (Cognitive)  WFL  -MB      Orientation Status (Cognition)  oriented x 4  -MB      Safety Deficit (Cognitive)  safety precautions awareness  -MB      Personal Safety Interventions  fall prevention program maintained;gait belt;muscle strengthening facilitated;nonskid shoes/slippers when out of bed  -MB      Recorded by [MB] Lynda Leiva, PT 01/22/19 1114      Row Name 01/22/19 0930             Safety Issues, Functional Mobility    Impairments Affecting Function (Mobility)  balance;endurance/activity tolerance;pain;shortness of breath;strength  -MB      Recorded by [MB] Lynda Leiva, PT 01/22/19 1114      Row Name 01/22/19 0930             Bed Mobility Assessment/Treatment    Comment (Bed Mobility)  UIC  -MB      Recorded by [MB] Lynda Leiva, PT 01/22/19 1114      Row Name 01/22/19 0930             Transfer Assessment/Treatment    Transfer Assessment/Treatment  sit-stand transfer;stand-sit transfer;toilet transfer  -MB      Comment (Transfers)  VCs for sternal precautions.  -MB      Recorded by [MB] Lynda Leiva, PT 01/22/19 1114      Row Name 01/22/19 0930             Sit-Stand Transfer    Sit-Stand [place] (Transfers)  contact guard;verbal cues  -MB      Assistive Device (Sit-Stand Transfers)  other (see comments) gait belt; cardiac pillow  -MB      Recorded by [MB] Lynda Leiva, PT 01/22/19 1114      Row Name 01/22/19 0930             Stand-Sit Transfer    Stand-Sit [place] (Transfers)  contact guard;verbal cues  -MB      Assistive Device (Stand-Sit Transfers)   other (see comments) gait belt  -MB      Recorded by [MB] Lynda Leiva, PT 01/22/19 1114      Row Name 01/22/19 0930             Toilet Transfer    Type (Toilet Transfer)  sit-stand;stand-sit  -MB      [place] Level (Toilet Transfer)  minimum assist (75% patient effort);verbal cues  -MB      Assistive Device (Toilet Transfer)  commode;grab bars/safety frame  -MB      Recorded by [MB] Lynda Leiva, PT 01/22/19 1114      Row Name 01/22/19 0930             Gait/Stairs Assessment/Training    [place] Level (Gait)  contact guard;verbal cues  -MB      Assistive Device (Gait)  -- LUE HHA  -MB      Distance in Feet (Gait)  350  -MB      Pattern (Gait)  step-through  -MB      Deviations/Abnormal Patterns (Gait)  deborah decreased;stride length decreased  -MB      Bilateral Gait Deviations  forward flexed posture;heel strike decreased  -MB      Negotiation (Stairs)  ascending technique;descending technique;stairs independence;stairs assistive device;handrail location;number of steps  -MB      [place] Level (Stairs)  contact guard;verbal cues  -MB      Assistive Device (Stairs)  -- RUE HHA  -MB      Number of Steps (Stairs)  12  -MB      Ascending Technique (Stairs)  step-over-step  -MB      Descending Technique (Stairs)  step-to-step  -MB      Stairs, Safety Issues  sequencing ability decreased  -MB      Stairs, Impairments  strength decreased  -MB      Recorded by [MB] Lynda Leiva, PT 01/22/19 1114      Row Name 01/22/19 0930             Motor Skills Assessment/Interventions    Additional Documentation  Therapeutic Exercise (Group)  -MB      Recorded by [MB] Lynda Leiva, PT 01/22/19 1114      Row Name 01/22/19 0930             Therapeutic Exercise    Therapeutic Exercise  seated, lower extremities  -MB      Additional Documentation  Therapeutic Exercise (Row)  -MB      Recorded by [MB] Lynda Leiva, PT 01/22/19 1114      Row Name 01/22/19 0930             Lower Extremity  Seated Therapeutic Exercise    Performed, Seated Lower Extremity (Therapeutic Exercise)  hip flexion/extension;hip abduction/adduction;ankle dorsiflexion/plantarflexion;LAQ (long arc quad), knee extension  -MB      Exercise Type, Seated Lower Extremity (Therapeutic Exercise)  AROM (active range of motion)  -MB      Sets/Reps Detail, Seated Lower Extremity (Therapeutic Exercise)  15 each, BLEs  -MB      Recorded by [MB] Lynda Leiva, PT 01/22/19 1114      Row Name 01/22/19 0930             Therapeutic Exercise    Upper Extremity Range of Motion (Therapeutic Exercise)  elbow flexion/extension, bilateral  -MB      Recorded by [MB] Lynda Leiva, PT 01/22/19 1114      Row Name 01/22/19 0930             Static Standing Balance    Level of [place] (Supported Standing, Static Balance)  contact guard assist  -MB      Time Able to Maintain Position (Supported Standing, Static Balance)  3 to 4 minutes  -MB      Assistive Device Utilized (Supported Standing, Static Balance)  -- L UE HHA  -MB      Recorded by [MB] Lynda Leiva, PT 01/22/19 1114      Row Name 01/22/19 0930             Positioning and Restraints    Pre-Treatment Position  sitting in chair/recliner  -MB      Post Treatment Position  chair  -MB      In Chair  notified nsg;reclined;call light within reach;encouraged to call for assist;with family/caregiver;legs elevated;waffle cushion  -MB      Recorded by [MB] Lynda Leiva, PT 01/22/19 1114      Row Name 01/22/19 0930             Pain Scale: Numbers Pre/Post-Treatment    Pain Scale: Numbers, Pretreatment  3/10  -MB      Pain Scale: Numbers, Post-Treatment  3/10  -MB      Pain Location - Orientation  incisional  -MB      Recorded by [MB] Lynda Leiva, PT 01/22/19 1114      Row Name                Wound 01/17/19 0754 chest incision    Wound - Properties Group Date first assessed: 01/17/19 [JOVI] Time first assessed: 0754 [JOVI] Location: chest [JOVI] Type: incision [JOVI] Recorded by:   [JOVI] Smitha Bunn RN 01/17/19 0754    Row Name                Wound 01/17/19 0754 Right leg incision    Wound - Properties Group Date first assessed: 01/17/19 [JOVI] Time first assessed: 0754 [JOVI] Side: Right [JOVI] Location: leg [JOVI] Type: incision [JOVI] Recorded by:  [JOVI] Smitha Bunn RN 01/17/19 0754    Row Name                Wound 01/21/19 0815 Right neck puncture    Wound - Properties Group Date first assessed: 01/21/19 [JJ] Time first assessed: 0815 [JJ] Present On Admission : no [JJ] Side: Right [JJ] Location: neck [JJ] Type: puncture [JJ], s/p central line  Recorded by:  [ARNOLDO] Demond Simon RN 01/21/19 0838    Row Name 01/22/19 0930             Plan of Care Review    Plan of Care Reviewed With  patient;spouse  -MB      Recorded by [MB] Lynda Leiva, PT 01/22/19 1114      Row Name 01/22/19 0930             Outcome Summary/Treatment Plan (PT)    Daily Summary of Progress (PT)  progress toward functional goals is good  -MB      Anticipated Discharge Disposition (PT)  home with assist  -MB      Recorded by [MB] Lynda Leiva, PT 01/22/19 1114        User Key  (r) = Recorded By, (t) = Taken By, (c) = Cosigned By    Initials Name Effective Dates Discipline    JOVI Smitha Bunn RN 08/01/16 -  Nurse    Lynda Deras, PT 03/14/16 -  PT    Demond Martin RN 06/16/16 -  Nurse          Wound 01/17/19 0754 chest incision (Active)   Dressing Appearance open to air 1/22/2019  8:20 AM   Closure Liquid skin adhesive 1/22/2019  8:20 AM   Base clean;dry 1/22/2019  8:20 AM   Periwound intact;dry 1/22/2019  8:20 AM   Periwound Temperature warm 1/22/2019  8:20 AM   Periwound Skin Turgor soft 1/22/2019  8:20 AM   Drainage Amount none 1/22/2019  8:20 AM   Dressing Care, Wound open to air 1/22/2019  8:20 AM       Wound 01/17/19 0754 Right leg incision (Active)   Dressing Appearance open to air 1/22/2019  8:20 AM   Closure Liquid skin adhesive 1/22/2019  8:20 AM   Base clean;dry 1/22/2019  8:20 AM   Periwound  intact 1/22/2019  8:20 AM   Periwound Temperature warm 1/22/2019  8:20 AM   Periwound Skin Turgor soft 1/22/2019  8:20 AM   Drainage Amount none 1/22/2019  8:20 AM   Dressing Care, Wound open to air 1/22/2019  8:20 AM       Wound 01/21/19 0815 Right neck puncture (Active)   Dressing Appearance dry;intact 1/22/2019  8:20 AM   Dressing Care, Wound gauze;low-adherent 1/22/2019  8:20 AM           Physical Therapy Education     Title: PT OT SLP Therapies (Done)     Topic: Physical Therapy (Done)     Point: Mobility training (Done)     Learning Progress Summary           Patient Acceptance, E,D, VU,NR by MB at 1/22/2019 11:14 AM    Comment:  HEP, negotiating stairs safely, home safety, sternal precautions, POC progression    Eager, E,D, NR by JAIME at 1/20/2019 11:44 AM    Acceptance, E, VU,NR by  at 1/19/2019 11:04 AM    Comment:  Pt educated on POC, sternal/cardiac precautions, importance of OOB activity  and safe functional mobility.    Acceptance, E, NR by ORESTES at 1/18/2019  2:00 PM   Family Acceptance, E,D, VU,NR by MB at 1/22/2019 11:14 AM    Comment:  HEP, negotiating stairs safely, home safety, sternal precautions, POC progression                   Point: Home exercise program (Done)     Learning Progress Summary           Patient Acceptance, E,D, VU,NR by MB at 1/22/2019 11:14 AM    Comment:  HEP, negotiating stairs safely, home safety, sternal precautions, POC progression    Eager, E,D, NR by JAIME at 1/20/2019 11:44 AM    Acceptance, E, VU,NR by  at 1/19/2019 11:04 AM    Comment:  Pt educated on POC, sternal/cardiac precautions, importance of OOB activity  and safe functional mobility.    Acceptance, E, NR by ORESTES at 1/18/2019  2:00 PM   Family Acceptance, E,D, VU,NR by MB at 1/22/2019 11:14 AM    Comment:  HEP, negotiating stairs safely, home safety, sternal precautions, POC progression                   Point: Body mechanics (Done)     Learning Progress Summary           Patient Acceptance, E,D, VU,NR by MB at  1/22/2019 11:14 AM    Comment:  HEP, negotiating stairs safely, home safety, sternal precautions, POC progression    Eager, E,D, NR by JAIME at 1/20/2019 11:44 AM    Acceptance, E, NR by MARYBEL at 1/20/2019  9:00 AM    Acceptance, E, VU,NR by  at 1/19/2019 11:04 AM    Comment:  Pt educated on POC, sternal/cardiac precautions, importance of OOB activity  and safe functional mobility.    Acceptance, E, NR by ORESTES at 1/18/2019  2:00 PM   Family Acceptance, E,D, VU,NR by MB at 1/22/2019 11:14 AM    Comment:  HEP, negotiating stairs safely, home safety, sternal precautions, POC progression                   Point: Precautions (Done)     Learning Progress Summary           Patient Acceptance, E,D, VU,NR by MB at 1/22/2019 11:14 AM    Comment:  HEP, negotiating stairs safely, home safety, sternal precautions, POC progression    Eager, E,D, NR by JAIME at 1/20/2019 11:44 AM    Acceptance, E, VU,NR by  at 1/19/2019 11:04 AM    Comment:  Pt educated on POC, sternal/cardiac precautions, importance of OOB activity  and safe functional mobility.    Acceptance, E, NR by ORESTES at 1/18/2019  2:00 PM   Family Acceptance, E,D, VU,NR by MB at 1/22/2019 11:14 AM    Comment:  HEP, negotiating stairs safely, home safety, sternal precautions, POC progression                               User Key     Initials Effective Dates Name Provider Type Discipline    ORESTES 06/19/15 -  Rosalie Prieto, PT Physical Therapist PT    DM 06/19/15 -  Sandra Bryant, PT Physical Therapist PT    JT 12/06/18 -  Smitha Daley, RN Registered Nurse Nurse    MB 03/14/16 -  Lynda Leiva, PT Physical Therapist PT    JR 12/06/18 -  Marlys Mckeon, PT Student PT Student PT                PT Recommendation and Plan  Anticipated Discharge Disposition (PT): home with assist  Therapy Frequency (PT Clinical Impression): daily  Outcome Summary/Treatment Plan (PT)  Daily Summary of Progress (PT): progress toward functional goals is good  Anticipated Discharge Disposition  (PT): home with assist  Plan of Care Reviewed With: patient, spouse  Progress: improving  Outcome Summary: Pt. demo improved safety and independence w/ mobility, w/ improved activity tolerance.  Pt. performed sit to stand transfers w/ CGA/min A and ambulated 350 ft w/ HHA, VSS.  Pt. negotiated 12 steps w/ HHA to simulate home entry, and participated in seated TherEx/HEP.  Recommend cont IPPT per POC.  Outcome Measures     Row Name 01/22/19 0930 01/20/19 1110          How much help from another person do you currently need...    Turning from your back to your side while in flat bed without using bedrails?  4  -MB  3  -DM     Moving from lying on back to sitting on the side of a flat bed without bedrails?  3  -MB  2  -DM     Moving to and from a bed to a chair (including a wheelchair)?  3  -MB  3  -DM     Standing up from a chair using your arms (e.g., wheelchair, bedside chair)?  3  -MB  3  -DM     Climbing 3-5 steps with a railing?  3  -MB  2  -DM     To walk in hospital room?  3  -MB  3  -DM     AM-PAC 6 Clicks Score  19  -MB  16  -DM        Functional Assessment    Outcome Measure Options  AM-PAC 6 Clicks Basic Mobility (PT)  -MB  AM-PAC 6 Clicks Basic Mobility (PT)  -DM       User Key  (r) = Recorded By, (t) = Taken By, (c) = Cosigned By    Initials Name Provider Type    Sandra Strickland, PT Physical Therapist    Lynda Deras, PT Physical Therapist         Time Calculation:   PT Charges     Row Name 01/22/19 1118             Time Calculation    Start Time  0930  -MB      PT Received On  01/22/19  -MB      PT Goal Re-Cert Due Date  01/28/19  -MB         Time Calculation- PT    Total Timed Code Minutes- PT  45 minute(s)  -MB         Timed Charges    89490 - PT Therapeutic Exercise Minutes  15  -MB      66683 - Gait Training Minutes   30  -MB        User Key  (r) = Recorded By, (t) = Taken By, (c) = Cosigned By    Initials Name Provider Type    Lynda Deras, PT Physical Therapist         Therapy Suggested Charges     Code   Minutes Charges    52098 (CPT®) Hc Pt Neuromusc Re Education Ea 15 Min      97173 (CPT®) Hc Pt Ther Proc Ea 15 Min 15 1    26134 (CPT®) Hc Gait Training Ea 15 Min 30 2    11168 (CPT®) Hc Pt Therapeutic Act Ea 15 Min      40596 (CPT®) Hc Pt Manual Therapy Ea 15 Min      68710 (CPT®) Hc Pt Iontophoresis Ea 15 Min      96755 (CPT®) Hc Pt Elec Stim Ea-Per 15 Min      36638 (CPT®) Hc Pt Ultrasound Ea 15 Min      32041 (CPT®) Hc Pt Self Care/Mgmt/Train Ea 15 Min      42913 (CPT®) Hc Pt Prosthetic (S) Train Initial Encounter, Each 15 Min      34069 (CPT®) Hc Pt Orthotic(S)/Prosthetic(S) Encounter, Each 15 Min      69987 (CPT®) Hc Orthotic(S) Mgmt/Train Initial Encounter, Each 15min      Total  45 3        Therapy Charges for Today     Code Description Service Date Service Provider Modifiers Qty    49157652206 HC PT THER PROC EA 15 MIN 1/22/2019 Lynda Leiva, PT GP 1    00833571639 HC GAIT TRAINING EA 15 MIN 1/22/2019 Lynda Leiva, PT GP 2          PT G-Codes  Outcome Measure Options: AM-PAC 6 Clicks Basic Mobility (PT)  AM-PAC 6 Clicks Score: 19    Lynda Leiva, PT  1/22/2019

## 2019-01-22 NOTE — PROGRESS NOTES
CTS Progress Note      POD: 5    S/p CABG x 4    Chief Complaint: tired    Subjective  Transferred to tele yesterday - no complaints. Wife at bedside.       Objective    Physical Exam:   Vital Signs   Temp:  [98 °F (36.7 °C)-98.9 °F (37.2 °C)] 98 °F (36.7 °C)  Heart Rate:  [79-84] 81  Resp:  [16-20] 16  BP: (120-149)/(60-71) 130/65   GEN: NAD   RESP: CTA bilaterally   CV: regular rhythm rate 81 NSR   ABD:soft nontender    EXT: warm, trace edema bilaterally, EVH ok   INT: Incision c/d/i       Intake/Output Summary (Last 24 hours) at 1/22/2019 0758  Last data filed at 1/22/2019 0606  Gross per 24 hour   Intake 360 ml   Output 750 ml   Net -390 ml     Results     Results from last 7 days   Lab Units 01/22/19  0416 01/21/19  0331   WBC 10*3/mm3  --  7.61   HEMOGLOBIN g/dL 9.1* 8.7*   HEMATOCRIT % 26.7* 25.2*   PLATELETS 10*3/mm3  --  185     Results from last 7 days   Lab Units 01/22/19  0416   SODIUM mmol/L 138   POTASSIUM mmol/L 3.6   CHLORIDE mmol/L 110*   CO2 mmol/L 24.0   BUN mg/dL 17   CREATININE mg/dL 0.79   GLUCOSE mg/dL 88   CALCIUM mg/dL 8.7     Results from last 7 days   Lab Units 01/18/19  0352 01/17/19  1146   INR  1.34* 1.27*   APTT seconds  --  36.3             Assessment/Plan        Severe CAD with occluded RCA and 90% left main   S/p CABG x 4 (1/17/19)  Unstable angina  Hypertension  Hyperlipidemia  Hypothyroidism on replacement    EF 61-65% (per TTE 1/1/19)        Plan   Home tomorrow  Lasix 40 IV x 1 today, K+ 20 meq PO    Susie Martinez PA-C  01/22/19  7:58 AM

## 2019-01-23 VITALS
SYSTOLIC BLOOD PRESSURE: 137 MMHG | HEART RATE: 91 BPM | DIASTOLIC BLOOD PRESSURE: 70 MMHG | OXYGEN SATURATION: 92 % | BODY MASS INDEX: 29.43 KG/M2 | WEIGHT: 187.5 LBS | HEIGHT: 67 IN | TEMPERATURE: 98.4 F | RESPIRATION RATE: 18 BRPM

## 2019-01-23 LAB
ANION GAP SERPL CALCULATED.3IONS-SCNC: 10 MMOL/L (ref 3–11)
BUN BLD-MCNC: 18 MG/DL (ref 9–23)
BUN/CREAT SERPL: 23.4 (ref 7–25)
CALCIUM SPEC-SCNC: 8.5 MG/DL (ref 8.7–10.4)
CHLORIDE SERPL-SCNC: 108 MMOL/L (ref 99–109)
CO2 SERPL-SCNC: 22 MMOL/L (ref 20–31)
CREAT BLD-MCNC: 0.77 MG/DL (ref 0.6–1.3)
GFR SERPL CREATININE-BSD FRML MDRD: 99 ML/MIN/1.73
GLUCOSE BLD-MCNC: 90 MG/DL (ref 70–100)
HCT VFR BLD AUTO: 28.5 % (ref 38.9–50.9)
HGB BLD-MCNC: 9.8 G/DL (ref 13.1–17.5)
POTASSIUM BLD-SCNC: 3.8 MMOL/L (ref 3.5–5.5)
SODIUM BLD-SCNC: 140 MMOL/L (ref 132–146)

## 2019-01-23 PROCEDURE — 80048 BASIC METABOLIC PNL TOTAL CA: CPT | Performed by: PHYSICIAN ASSISTANT

## 2019-01-23 PROCEDURE — 85014 HEMATOCRIT: CPT | Performed by: PHYSICIAN ASSISTANT

## 2019-01-23 PROCEDURE — 85018 HEMOGLOBIN: CPT | Performed by: PHYSICIAN ASSISTANT

## 2019-01-23 PROCEDURE — 99024 POSTOP FOLLOW-UP VISIT: CPT | Performed by: PHYSICIAN ASSISTANT

## 2019-01-23 PROCEDURE — 99024 POSTOP FOLLOW-UP VISIT: CPT | Performed by: THORACIC SURGERY (CARDIOTHORACIC VASCULAR SURGERY)

## 2019-01-23 RX ORDER — ATORVASTATIN CALCIUM 40 MG/1
40 TABLET, FILM COATED ORAL NIGHTLY
Qty: 30 TABLET | Refills: 12 | Status: SHIPPED | OUTPATIENT
Start: 2019-01-23 | End: 2019-01-24

## 2019-01-23 RX ADMIN — THYROID, PORCINE 45 MG: 30 TABLET ORAL at 05:48

## 2019-01-23 RX ADMIN — SODIUM CHLORIDE, PRESERVATIVE FREE 3 ML: 5 INJECTION INTRAVENOUS at 08:30

## 2019-01-23 RX ADMIN — FAMOTIDINE 20 MG: 20 TABLET ORAL at 08:30

## 2019-01-23 RX ADMIN — ASPIRIN 325 MG: 325 TABLET, DELAYED RELEASE ORAL at 08:30

## 2019-01-23 RX ADMIN — METOPROLOL TARTRATE 12.5 MG: 25 TABLET, FILM COATED ORAL at 08:29

## 2019-01-23 NOTE — PROGRESS NOTES
Case Management Discharge Note    Final Note: I met with Mr. Hardy and his wife at the bedside. He is anticipating being discharged home today. He denies needs for therapy or DME at home. His wife will transport him home by car.    Destination      No service has been selected for the patient.      Durable Medical Equipment      No service has been selected for the patient.      Dialysis/Infusion      No service has been selected for the patient.      Home Medical Care      No service has been selected for the patient.      Community Resources      No service has been selected for the patient.             Final Discharge Disposition Code: 01 - home or self-care

## 2019-01-23 NOTE — PROGRESS NOTES
CTS Progress Note      POD: 6   S/p CABG x 4    Chief Complaint: tired    Subjective  Transferred to tele yesterday - no complaints. Wife at bedside.   Anxious to go home    Objective    Physical Exam:   Vital Signs   Temp:  [97.8 °F (36.6 °C)-98.9 °F (37.2 °C)] 98.5 °F (36.9 °C)  Heart Rate:  [82-93] 85  Resp:  [16] 16  BP: (122-148)/(54-74) 141/69   GEN: NAD   RESP: CTA bilaterally   CV: regular rhythm rate 81 NSR   ABD:soft nontender    EXT: warm, trace edema bilaterally, EVH ok   INT: Incision c/d/i       Intake/Output Summary (Last 24 hours) at 1/23/2019 0756  Last data filed at 1/23/2019 0548  Gross per 24 hour   Intake --   Output 1650 ml   Net -1650 ml     Results     Results from last 7 days   Lab Units 01/23/19  0430  01/21/19  0331   WBC 10*3/mm3  --   --  7.61   HEMOGLOBIN g/dL 9.8*   < > 8.7*   HEMATOCRIT % 28.5*   < > 25.2*   PLATELETS 10*3/mm3  --   --  185    < > = values in this interval not displayed.     Results from last 7 days   Lab Units 01/23/19  0430   SODIUM mmol/L 140   POTASSIUM mmol/L 3.8   CHLORIDE mmol/L 108   CO2 mmol/L 22.0   BUN mg/dL 18   CREATININE mg/dL 0.77   GLUCOSE mg/dL 90   CALCIUM mg/dL 8.5*     Results from last 7 days   Lab Units 01/18/19  0352 01/17/19  1146   INR  1.34* 1.27*   APTT seconds  --  36.3             Assessment/Plan        Severe CAD with occluded RCA and 90% left main   S/p CABG x 4 (1/17/19)  Unstable angina  Hypertension  Hyperlipidemia  Hypothyroidism on replacement    EF 61-65% (per TTE 1/1/19)        Plan   Home today  Lasix 40 IV x 1 today, K+ 20 meq PO    OSCAR Alvarez  01/23/19  7:56 AM

## 2019-01-23 NOTE — PLAN OF CARE
Problem: Patient Care Overview  Goal: Plan of Care Review  Outcome: Ongoing (interventions implemented as appropriate)   01/23/19 9374   Coping/Psychosocial   Plan of Care Reviewed With patient;spouse   Plan of Care Review   Progress improving   OTHER   Outcome Summary VSS on room air. No complaints of pain. Rested well throughout the night. Possiblly going home today.       Problem: Fall Risk (Adult)  Goal: Absence of Fall  Outcome: Ongoing (interventions implemented as appropriate)

## 2019-01-23 NOTE — DISCHARGE SUMMARY
Date of Discharge:  1/23/2019              [facility]  Date of Admit: 1/16/2019    Mario Bashir MD      Discharge Diagnosis:  Severe CAD with occluded RCA and 90% left main    Unstable angina (CMS/HCC)    S/P CABG x 4 on 1/17/19    Hypertension    Hyperlipidemia    Hypothyroidism on replacement      Hospital Course: Mr. Hardy is a pleasant 73-year-old male with hypertension, hypercholesterolemia and CAD.  He was admitted to [facility] on 1/16/19 due to unstable angina.  He underwent heart catheterization which revealed severe left main and three-vessel CAD.  He subsequently underwent underwent a four-vessel CABG by Dr. Coppola.  He tolerated the procedure well there were no consultations.  EF by echocardiogram was normal.  Today Mr. Hardy is denying any complaints and is therefore for ready for discharge.    Procedures Performed  Procedure(s):  MEDIAN STERNOTOMY, CORONARY ARTERY BYPASS GRAFT X  4, UTILIZING THE LEFT INTERNAL MAMMARY ARTERY, AND  EVH OF THE RIGHT GREATER SAPHENOUS VEIN       Consults     Date and Time Order Name Status Description    1/21/2019 1039 Inpatient Consult to Hospitalist PETRONA for Medical Management      1/17/2019 1135 Inpatient Consult to Cardiology            Pertinent Test Results: angiography: Severe left main and three-vessel CAD  .      Discharge Physical Exam:    General Appearance No acute distress   Neck No adenopathy, supple, trachea midline, no JVD   Lungs Clear to auscultation,respirations regular, even and unlabored   Heart Regular rhythm and normal rate, normal S1 and S2, no murmur, no gallop, no rub, no click   Chest wall No abnormalities observed   Abdomen Normal bowel sounds, no masses, no hepatomegaly, soft   Extremities Moves all extremities well, no edema, no cyanosis, no redness   Neurological Alert and oriented x 3     Discharge Medications     Discharge Medications      New Medications      Instructions Start Date   atorvastatin  40 MG tablet  Commonly known as:  LIPITOR   40 mg, Oral, Nightly      metoprolol tartrate 25 MG tablet  Commonly known as:  LOPRESSOR   12.5 mg, Oral, Every 12 Hours Scheduled         Changes to Medications      Instructions Start Date   aspirin 325 MG EC tablet  What changed:    · medication strength  · how much to take   325 mg, Oral, Daily         Continue These Medications      Instructions Start Date   anastrozole 1 MG tablet  Commonly known as:  ARIMIDEX   0.5 mg, Oral, 2 Times Weekly      ascorbic acid 1000 MG tablet  Commonly known as:  VITAMIN C   1,000 mg, Oral, 2 Times Daily      CO-Q 10 OMEGA-3 FISH OIL PO   1 tablet, Oral      FLAX SEEDS PO   1 dose, Oral      ISIDRO PO   1 dose, Oral, 2 Times Daily      l-lysine 500 MG tablet tablet   1,000 mg, Oral, Daily      L-Proline 500 MG capsule   1 capsule, Oral, Every Morning      Magnesium Bisglycinate 100 MG tablet   1 tablet, Oral, 2 Times Daily      NATTOKINASE PO   100 mg, Oral, Every Morning      niacin 500 MG tablet   500 mg, Oral, Nightly      nitroglycerin 0.4 MG SL tablet  Commonly known as:  NITROSTAT   0.4 mg, Sublingual, Every 5 Minutes PRN, Take no more than 3 doses in 15 minutes.       NON FORMULARY   1 dose, 2 Times Daily, POTASSIUM GLYCINATE       QUNOL COQ10/UBIQUINOL/IRENE 100 MG capsule  Generic drug:  Ubiquinol   1 capsule, Oral, 2 Times Daily      SAW PALMETTO COMPLEX PO   1 dose, Oral      thyroid 15 MG tablet  Commonly known as:  ARMOUR   45 mg, Oral, Daily             Discharge Diet: cardiac    Activity at Discharge: as tolerated    Discharge disposition: home    Condition on Discharge: stable    Follow-up Appointments  Future Appointments   Date Time Provider Department Center   1/31/2019 11:00 AM Aishwarya Jones APRN [facility]     Additional Instructions for the Follow-ups that You Need to Schedule     Discharge Follow-up with Specified Provider: Dr. Piedra; 1 Month   As directed      To:  Dr. Piedra    Follow Up:  1  Month                  OSCAR Amador  01/23/19  8:34 AM

## 2019-01-24 ENCOUNTER — READMISSION MANAGEMENT (OUTPATIENT)
Dept: CALL CENTER | Facility: HOSPITAL | Age: 74
End: 2019-01-24

## 2019-01-24 RX ORDER — ATORVASTATIN CALCIUM 40 MG/1
40 TABLET, FILM COATED ORAL NIGHTLY
Qty: 30 TABLET | Refills: 12 | Status: SHIPPED | OUTPATIENT
Start: 2019-01-24

## 2019-01-24 NOTE — OUTREACH NOTE
Prep Survey      Responses   Facility patient discharged from?  [facility]   Is patient eligible?  Yes   Discharge diagnosis  unstable angina, s/p heart cath and CABG   Does the patient have one of the following disease processes/diagnoses(primary or secondary)?  Cardiothoracic surgery   Does the patient have Home health ordered?  No   Is there a DME ordered?  No   Prep survey completed?  Yes          Gabrielle Rincon RN

## 2019-01-25 ENCOUNTER — READMISSION MANAGEMENT (OUTPATIENT)
Dept: CALL CENTER | Facility: HOSPITAL | Age: 74
End: 2019-01-25

## 2019-01-25 ENCOUNTER — TELEPHONE (OUTPATIENT)
Dept: CARDIAC SURGERY | Facility: CLINIC | Age: 74
End: 2019-01-25

## 2019-01-25 NOTE — TELEPHONE ENCOUNTER
Patient called stating his right leg all the way down to his right foot is swollen,red, and warm to touch. Patient had a CABG x4 done on 01/17/2019 by Dr. Coppola and was discharged from hospital on 1/23/2019. Patient wanted to know was there anything he can do to help the swelling. I Spoke with Lobito Butt as well as Susie Vanegas, they both advised me to tell patient to go the the nearest ER.

## 2019-01-25 NOTE — OUTREACH NOTE
CT Surgery Week 1 Survey      Responses   Facility patient discharged from?  [facility]   Does the patient have one of the following disease processes/diagnoses(primary or secondary)?  Cardiothoracic surgery   Is there a successful TCM telephone encounter documented?  No   Week 1 attempt successful?  Yes   Call start time  1423   Call end time  1434   Is patient permission given to speak with other caregiver?  Yes   List who call center can speak with  [facility]   Meds reviewed with patient/caregiver?  Yes   Is the patient having any side effects they believe may be caused by any medication additions or changes?  No   Does the patient have all medications related to this admission filled (includes all antibiotics, pain medications, cardiac medications, etc.)  Yes   Is the patient taking all medications as directed (includes completed medication regime)?  Yes   Comments regarding appointments  reviewed appointment   Does the patient have a primary care provider?   Yes   Does the patient have an appointment scheduled with their C/T surgeon?  Yes   Has the patient kept scheduled appointments due by today?  N/A   Has home health visited the patient within 72 hours of discharge?  N/A   Did the patient receive a copy of their discharge instructions?  Yes   Nursing interventions  Reviewed instructions with patient   What is the patient's perception of their health status since discharge?  New symptoms unrelated to diagnosis   Nursing interventions  Advised to call surgeon   Is the patient /caregiver able to teach back basic post-op care?  Continue use of incentive spirometry at least 1 week post discharge, Practice 'cough and deep breath', Drive as instructed by MD in discharge instructions, Take showers only when approved by MD-sponge bathe until then, Keep incision areas clean, dry and protected, Lifting as instructed by MD in discharge instructions   Is the patient/caregiver able to teach back signs and symptoms of incisional  infection?  Increased redness, swelling or pain at the incisonal site, Increased drainage or bleeding, Incisional warmth, Pus or odor from incision   Is the patient/caregiver able to teach back steps to recovery at home?  Weigh daily, Rest and rebuild strength, gradually increase activity   Is the patient /caregiver able to teach back the importance of cardiac rehab?  No [did not discuss patient is having problems with right leg]   Is the patient/caregiver able to teach back the hierarchy of who to call/visit for symptoms/problems? PCP, Specialist, Home health nurse, Urgent Care, ED, 911  Yes   Week 1 call completed?  Yes   Wrap up additional comments  patient is complaining of brusing  and pain in right leg with swelling and hurts to walk on the leg, leg is more swollen  and hurts more with activity since he has been home from the hospital. will be calling his surgeon            Chaya Kidd RN

## 2019-01-30 ENCOUNTER — NURSE TRIAGE (OUTPATIENT)
Dept: CALL CENTER | Facility: HOSPITAL | Age: 74
End: 2019-01-30

## 2019-01-30 NOTE — TELEPHONE ENCOUNTER
"Caller is 2 weeks post CABG-asking if can lay on back to sleep. Call made to Dr Theo Piedra's office for clarification. Call made back to patient-instructed to lay on back for 6 weeks post op for proper healing per Dr Piedra. Patient voiced understanding. States will call back if any further questions/concerns.    Reason for Disposition  • [1] Caller requesting NON-URGENT health information AND [2] PCP's office is the best resource    Additional Information  • Negative: [1] Caller is not with the adult (patient) AND [2] reporting urgent symptoms  • Negative: Lab result questions  • Negative: Medication questions  • Negative: Caller cannot be reached by phone  • Negative: Caller has already spoken to PCP or another triager  • Negative: RN needs further essential information from caller in order to complete triage  • Negative: Requesting regular office appointment    Answer Assessment - Initial Assessment Questions  1. REASON FOR CALL or QUESTION: \"What is your reason for calling today?\" or \"How can I best help you?\" or \"What question do you have that I can help answer?\"      Asking if can lay on side to sleep 2 weeks post CABG.    Protocols used: INFORMATION ONLY CALL-ADULT-      "

## 2019-02-01 ENCOUNTER — READMISSION MANAGEMENT (OUTPATIENT)
Dept: CALL CENTER | Facility: HOSPITAL | Age: 74
End: 2019-02-01

## 2019-02-01 NOTE — OUTREACH NOTE
"CT Surgery Week 2 Survey      Responses   Facility patient discharged from?  [facility]   Does the patient have one of the following disease processes/diagnoses(primary or secondary)?  Cardiothoracic surgery   Week 2 attempt successful?  Yes   Call start time  1032   Call end time  1037   Discharge diagnosis  unstable angina, s/p heart cath and CABG   Meds reviewed with patient/caregiver?  Yes   Is the patient taking all medications as directed (includes completed medication regime)?  Yes   Has the patient kept scheduled appointments due by today?  N/A   What is the patient's perception of their health status since discharge?  Improving   Is the patient/caregiver able to teach back signs and symptoms of incisional infection?  Increased redness, swelling or pain at the incisonal site, Increased drainage or bleeding, Pus or odor from incision, Incisional warmth   Is the patient/caregiver able to teach back steps to recovery at home?  Rest and rebuild strength, gradually increase activity   Additional teach back comments  Pt says he is \"doing good\"  Incision is \"healing pretty good\" Walking fine   Week 2 call completed?  Yes          Marzena Charles RN  "

## 2019-02-08 ENCOUNTER — READMISSION MANAGEMENT (OUTPATIENT)
Dept: CALL CENTER | Facility: HOSPITAL | Age: 74
End: 2019-02-08

## 2019-02-08 NOTE — OUTREACH NOTE
CT Surgery Week 3 Survey      Responses   Facility patient discharged from?  [facility]   Does the patient have one of the following disease processes/diagnoses(primary or secondary)?  Cardiothoracic surgery   Week 3 attempt successful?  Yes   Call start time  1600   Call end time  1603   Meds reviewed with patient/caregiver?  Yes   Is the patient taking all medications as directed (includes completed medication regime)?  Yes   Comments regarding appointments  family doctor this past monday   Has the patient kept scheduled appointments due by today?  Yes   What is the patient's perception of their health status since discharge?  Improving   Week 3 call completed?  Yes   Wrap up additional comments  doing well , has coughing and  using the incentive spirometer and is walking          Chaya Kidd RN

## 2019-02-12 ENCOUNTER — DOCUMENTATION (OUTPATIENT)
Dept: CARDIAC REHAB | Facility: HOSPITAL | Age: 74
End: 2019-02-12

## 2019-02-12 ENCOUNTER — TELEPHONE (OUTPATIENT)
Dept: CARDIAC REHAB | Facility: HOSPITAL | Age: 74
End: 2019-02-12

## 2019-02-16 ENCOUNTER — READMISSION MANAGEMENT (OUTPATIENT)
Dept: CALL CENTER | Facility: HOSPITAL | Age: 74
End: 2019-02-16

## 2019-02-16 NOTE — OUTREACH NOTE
CT Surgery Week 4 Survey      Responses   Facility patient discharged from?  [facility]   Does the patient have one of the following disease processes/diagnoses(primary or secondary)?  Cardiothoracic surgery   Week 4 attempt successful?  Yes   Call start time  1328   Call end time  1338   Discharge diagnosis  unstable angina, s/p heart cath and CABG   Meds reviewed with patient/caregiver?  Yes   Is the patient taking all medications as directed (includes completed medication regime)?  Yes   Has the patient kept scheduled appointments due by today?  Yes   Is the patient still receiving Home Health Services?  N/A   Psychosocial issues?  No   What is the patient's perception of their health status since discharge?  Improving   Is the patient/caregiver able to teach back normal signs of recovery?  Constipation   Nursing interventions  Reassured on normal signs of recovery   Nursing interventions  -- [Starting cardiac rehab on Tuesdsay 02/18]   Week 4 Call Completed?  Yes   Would the patient like one additional call?  No   Graduated  Yes   Did the patient feel the follow up calls were helpful during their recovery period?  Yes   Was the number of calls appropriate?  Yes          Gillian Dumont RN

## 2019-02-21 DIAGNOSIS — Z95.1 S/P CABG (CORONARY ARTERY BYPASS GRAFT): Primary | ICD-10-CM

## 2019-02-25 ENCOUNTER — OFFICE VISIT (OUTPATIENT)
Dept: CARDIAC SURGERY | Facility: CLINIC | Age: 74
End: 2019-02-25

## 2019-02-25 ENCOUNTER — HOSPITAL ENCOUNTER (OUTPATIENT)
Dept: GENERAL RADIOLOGY | Facility: HOSPITAL | Age: 74
Discharge: HOME | End: 2019-02-25
Admitting: PHYSICIAN ASSISTANT

## 2019-02-25 VITALS
HEIGHT: 68 IN | OXYGEN SATURATION: 98 % | HEART RATE: 88 BPM | WEIGHT: 173 LBS | SYSTOLIC BLOOD PRESSURE: 130 MMHG | TEMPERATURE: 98 F | DIASTOLIC BLOOD PRESSURE: 80 MMHG | BODY MASS INDEX: 26.22 KG/M2

## 2019-02-25 DIAGNOSIS — Z95.1 S/P CABG (CORONARY ARTERY BYPASS GRAFT): ICD-10-CM

## 2019-02-25 DIAGNOSIS — I25.110 CORONARY ARTERY DISEASE INVOLVING NATIVE CORONARY ARTERY OF NATIVE HEART WITH UNSTABLE ANGINA PECTORIS (HCC): Primary | ICD-10-CM

## 2019-02-25 PROCEDURE — 99024 POSTOP FOLLOW-UP VISIT: CPT | Performed by: PHYSICIAN ASSISTANT

## 2019-02-25 PROCEDURE — 71046 X-RAY EXAM CHEST 2 VIEWS: CPT

## 2019-02-25 NOTE — PROGRESS NOTES
02/25/2019  Patient Information  Yosef Hardy                                                                                          [address]  [place] [address]   1945  'PCP/Referring Physician'  Mario Bashir MD  [phone]  No ref. provider found    Chief Complaint   Patient presents with   • Post-op Follow-up     Hosp D/C CABG 1/17/19 for CAD       History of Present Illness:  Mr. Hardy underwent CABG x4 by Dr. Coppola on January 17, 2019.  He did well postoperatively and was discharged on January 23.  He presents today for a routine postoperative visit.  He denies any chest pain or dyspnea on exertion.  He has started cardiac rehab without any difficulties.  His only complaint today is some superficial numbness to the lower portion of his right leg.  He has no difficulty with wound healing.  This area is along the EVH site.  He states his heart rate and blood pressure have been controlled.  He has an appointment with Dr. Piedra tomorrow.    Patient Active Problem List   Diagnosis   • Unstable angina (CMS/HCC)   • Severe CAD with occluded RCA and 90% left main   • S/P CABG x 4 on 1/17/19   • Hypertension   • Hyperlipidemia   • Hypothyroidism on replacement     Past Medical History:   Diagnosis Date   • Coronary artery disease    • Disease of thyroid gland    • Hyperlipidemia    • Hypertension    • Seasonal allergies      Past Surgical History:   Procedure Laterality Date   • CARDIAC CATHETERIZATION N/A 1/16/2019    Procedure: Left Heart Cath;  Surgeon: Theo Piedra MD;  Location:  [facility];  Service: Cardiovascular   • CORONARY ARTERY BYPASS GRAFT N/A 1/17/2019    Procedure: MEDIAN STERNOTOMY, CORONARY ARTERY BYPASS GRAFT X  4, UTILIZING THE LEFT INTERNAL MAMMARY ARTERY, AND  EVH OF THE RIGHT GREATER SAPHENOUS VEIN;  Surgeon: Gato Coppola MD;  Location[facility];  Service: Cardiothoracic   • TONSILLECTOMY AND ADENOIDECTOMY     • VASECTOMY          Current Outpatient Medications:   •  aspirin 325 MG EC tablet, Take 1 tablet by mouth Daily., Disp: 30 tablet, Rfl: 3  •  atorvastatin (LIPITOR) 40 MG tablet, Take 1 tablet by mouth Every Night., Disp: 30 tablet, Rfl: 12  •  Coenzyme Q10-Fish Oil-Vit E (CO-Q 10 OMEGA-3 FISH OIL PO), Take 1 tablet by mouth., Disp: , Rfl:   •  metoprolol tartrate (LOPRESSOR) 25 MG tablet, Take 0.5 tablets by mouth Every 12 (Twelve) Hours., Disp: 60 tablet, Rfl: 12  •  thyroid (ARMOUR) 15 MG tablet, Take 45 mg by mouth Daily. 65 mg daily, Disp: , Rfl:   •  Ubiquinol (QUNOL COQ10/UBIQUINOL/IRENE) 100 MG capsule, Take 1 capsule by mouth 2 (Two) Times a Day., Disp: , Rfl:   •  anastrozole (ARIMIDEX) 1 MG tablet, Take 0.5 mg by mouth 2 (Two) Times a Week., Disp: , Rfl:   •  ascorbic acid (VITAMIN C) 1000 MG tablet, Take 1,000 mg by mouth 2 (Two) Times a Day., Disp: , Rfl:   •  Flaxseed, Linseed, (FLAX SEEDS PO), Take 1 dose by mouth., Disp: , Rfl:   •  ISIDRO PO, Take 1 dose by mouth 2 (Two) Times a Day., Disp: , Rfl:   •  L-Proline 500 MG capsule, Take 1 capsule by mouth Every Morning., Disp: , Rfl:   •  Lysine HCl (L-LYSINE) 500 MG tablet tablet, Take 1,000 mg by mouth Daily., Disp: , Rfl:   •  Magnesium Bisglycinate 100 MG tablet, Take 1 tablet by mouth 2 (Two) Times a Day., Disp: , Rfl:   •  NATTOKINASE PO, Take 100 mg by mouth Every Morning., Disp: , Rfl:   •  niacin 500 MG tablet, Take 500 mg by mouth Every Night., Disp: , Rfl:   •  nitroglycerin (NITROSTAT) 0.4 MG SL tablet, Place 0.4 mg under the tongue Every 5 (Five) Minutes As Needed for Chest Pain. Take no more than 3 doses in 15 minutes., Disp: , Rfl:   •  NON FORMULARY, 1 dose 2 (Two) Times a Day. POTASSIUM GLYCINATE, Disp: , Rfl:   •  Zn-Pyg Afri-Nettle-Saw Palmet (SAW PALMETTO COMPLEX PO), Take 1 dose by mouth., Disp: , Rfl:   No Known Allergies  Social History     Socioeconomic History   • Marital status:      Spouse name: Not on file   • Number of children:  "3   • Years of education: Not on file   • Highest education level: Not on file   Social Needs   • Financial resource strain: Not on file   • Food insecurity - worry: Not on file   • Food insecurity - inability: Not on file   • Transportation needs - medical: Not on file   • Transportation needs - non-medical: Not on file   Occupational History   • Occupation: [employer]     Comment: Retired   Tobacco Use   • Smoking status: Former Smoker     Years: 1.00     Types: Cigarettes     Last attempt to quit: 1959     Years since quittin.0   • Smokeless tobacco: Former User     Types: Chew     Quit date: 1959   • Tobacco comment: Smoked briefly in Teenage Years, quit same timeframe   Substance and Sexual Activity   • Alcohol use: No     Frequency: Never   • Drug use: No   • Sexual activity: Defer   Other Topics Concern   • Not on file   Social History Narrative    Lives in [place].      Family History   Problem Relation Age of Onset   • Heart failure Mother    • Hyperlipidemia Mother    • Heart failure Father      ROS  Vitals:    19 1023   BP: 130/80   BP Location: Left arm   Patient Position: Sitting   Pulse: 88   Temp: 98 °F (36.7 °C)   SpO2: 98%   Weight: 78.5 kg (173 lb)   Height: 172.7 cm (68\")      Physical Exam   General patient is alert and orientated in no acute distress.  Cardiac: Regular rate and rhythm no murmur rub or gallop  Respiratory: Lungs are clear to auscultation bilaterally  Sternum: Sternal incision has healed nicely, pretty O dressing was removed.  Sternum is stable.  Extremities: Warm with no edema bilaterally, EVH site is well-healed    Labs/Imaging:    Assessment/Plan:  Mr. Hardy is doing very well postoperatively.  He is able to drive and has no restrictions at this point.  He follows up with Dr. Piedra tomorrow.  The superficial area of numbness along the EVH track in his right lower extremity is not of concern.  This may take 6 months to a year to resolve.  " Overall he is doing very well and we will see him on an as needed basis.     Patient Active Problem List   Diagnosis   • Unstable angina (CMS/HCC)   • Severe CAD with occluded RCA and 90% left main   • S/P CABG x 4 on 1/17/19   • Hypertension   • Hyperlipidemia   • Hypothyroidism on replacement

## (undated) DEVICE — SUT PDS 1 CTX 36IN VIO PDP371T

## (undated) DEVICE — SUT SILK 0/0 CT2 18IN C027D

## (undated) DEVICE — CVR HNDL LT SURG ACCSSRY BLU STRL

## (undated) DEVICE — 2963 MEDIPORE SOFT CLOTH TAPE 3 IN X 10 YD 12 RLS/CS: Brand: 3M™ MEDIPORE™

## (undated) DEVICE — Device

## (undated) DEVICE — PAD ARMBRD SURG CONVOL 7.5X20X2IN

## (undated) DEVICE — SUT PROLN 7/0 CV BV1 24IN 8304H BX/36

## (undated) DEVICE — PK CATH CARD 10

## (undated) DEVICE — SUT PROLN 6/0 C1 D/A 30IN 8706H

## (undated) DEVICE — ANTIBACTERIAL UNDYED BRAIDED (POLYGLACTIN 910), SYNTHETIC ABSORBABLE SUTURE: Brand: COATED VICRYL

## (undated) DEVICE — MODEL AT P65, P/N 701554-001KIT CONTENTS: HAND CONTROLLER, 3-WAY HIGH-PRESSURE STOPCOCK WITH ROTATING END AND PREMIUM HIGH-PRESSURE TUBING: Brand: ANGIOTOUCH® KIT

## (undated) DEVICE — VASOVIEW HEMOPRO: Brand: VASOVIEW HEMOPRO

## (undated) DEVICE — NDL PERC 1PRT THNWALL W/BASEPLT 18G 7CM

## (undated) DEVICE — SAFETY SCALPEL: Brand: DEROYAL

## (undated) DEVICE — PRESSURE MONITORING SET: Brand: TRUWAVE, VAMP

## (undated) DEVICE — SUT PROLN 3/0 SH D/A 36IN 8522H

## (undated) DEVICE — CONNECTOR PH 6-IN-1 Y ST: Brand: CARDINAL HEALTH

## (undated) DEVICE — INTRAOPERATIVE COVER KIT, 10 PACK: Brand: SITE-RITE

## (undated) DEVICE — TUBING, SUCTION, 1/4" X 10', STRAIGHT: Brand: MEDLINE

## (undated) DEVICE — TRAP,MUCUS SPECIMEN,40CC: Brand: MEDLINE

## (undated) DEVICE — SKIN AFFIX SURG ADHESIVE 72/CS 0.55ML: Brand: MEDLINE

## (undated) DEVICE — SENSR CERBRL O2 SOMASENSOR ADHS A/ LF

## (undated) DEVICE — CATH DIAG EXPO M/ PK 5F FL4/FR4 PIG

## (undated) DEVICE — CATH DIAG EXPO .045 FL3  5F 100CM

## (undated) DEVICE — SUCTION CANISTER, 2500CC, RIGID: Brand: DEROYAL

## (undated) DEVICE — SUT SILK 2 SUTUPAK TIE 60IN SA8H 2STRAND

## (undated) DEVICE — CLTH CLENS READYCLEANSE PERI CARE PK/5

## (undated) DEVICE — DEV COMP RAD PRELUDESYNC 24CM

## (undated) DEVICE — PK HEART OPN 10

## (undated) DEVICE — MEDI-VAC NON-CONDUCTIVE SUCTION TUBING: Brand: CARDINAL HEALTH

## (undated) DEVICE — SYS SKIN CLS DERMABOND PRINEO W/22CM MESH TP

## (undated) DEVICE — Device: Brand: MEDEX

## (undated) DEVICE — GW INQWIRE FC PTFE STD J/1.5 .035 260

## (undated) DEVICE — PK SPECIALTYCARE M/STATE 1 OH 1/2V CUST

## (undated) DEVICE — INTRO SHEATH PRELUDE IDEAL SPRNG COIL 021 6F 23X80CM

## (undated) DEVICE — DRSNG SURESITE WNDW 4X4.5

## (undated) DEVICE — PRESSURE MONITORING ACCESSORY: Brand: TRUWAVE

## (undated) DEVICE — 12 FOOT DISPOSABLE EXTENSION CABLE WITH SAFE CONNECT / SCREW-DOWN

## (undated) DEVICE — TEMP PACING WIRE: Brand: MYO/WIRE

## (undated) DEVICE — BLD SCLPL BEAVR MINI STR 2BVL 180D LF

## (undated) DEVICE — SUT PROLN 4/0 RB1 D/A 36IN 8557H

## (undated) DEVICE — SUT PROLN 4/0 SH D/A 36IN 8521H

## (undated) DEVICE — OASIS DRAIN, SINGLE, INLINE & ATS COMPATIBLE: Brand: OASIS

## (undated) DEVICE — AVANTI + 4F STD W/GW: Brand: AVANTI

## (undated) DEVICE — SUT SILK 4/0 TIES 18IN A183H

## (undated) DEVICE — SOL NS 500ML

## (undated) DEVICE — KT INTRO SHEATH PERC W/FULL DRP 9F

## (undated) DEVICE — MODEL BT2000 P/N 700287-012KIT CONTENTS: MANIFOLD WITH SALINE AND CONTRAST PORTS, SALINE TUBING WITH SPIKE AND HAND SYRINGE, TRANSDUCER: Brand: BT2000 AUTOMATED MANIFOLD KIT

## (undated) DEVICE — SWAN-GANZ THERMODILUTION CATHETER: Brand: SWAN-GANZ

## (undated) DEVICE — TOWEL,OR,DSP,ST,BLUE,STD,8/PK,10PK/CS: Brand: MEDLINE